# Patient Record
Sex: FEMALE | Race: WHITE | NOT HISPANIC OR LATINO | Employment: OTHER | URBAN - METROPOLITAN AREA
[De-identification: names, ages, dates, MRNs, and addresses within clinical notes are randomized per-mention and may not be internally consistent; named-entity substitution may affect disease eponyms.]

---

## 2017-02-18 ENCOUNTER — HOSPITAL ENCOUNTER (EMERGENCY)
Facility: HOSPITAL | Age: 82
Discharge: HOME | End: 2017-02-18
Attending: EMERGENCY MEDICINE | Admitting: EMERGENCY MEDICINE
Payer: MEDICARE

## 2017-02-18 VITALS
TEMPERATURE: 99.2 F | HEART RATE: 68 BPM | DIASTOLIC BLOOD PRESSURE: 80 MMHG | RESPIRATION RATE: 16 BRPM | WEIGHT: 98 LBS | OXYGEN SATURATION: 99 % | HEIGHT: 56 IN | SYSTOLIC BLOOD PRESSURE: 166 MMHG | BODY MASS INDEX: 22.04 KG/M2

## 2017-02-18 DIAGNOSIS — R13.10 DYSPHAGIA, UNSPECIFIED TYPE: Primary | ICD-10-CM

## 2017-02-18 DIAGNOSIS — Z87.19 HISTORY OF ESOPHAGEAL STRICTURE: ICD-10-CM

## 2017-02-18 PROCEDURE — 99283 EMERGENCY DEPT VISIT LOW MDM: CPT

## 2017-02-18 RX ORDER — FEBUXOSTAT 40 MG/1
40 TABLET, FILM COATED ORAL EVERY MORNING
COMMUNITY

## 2017-03-31 ENCOUNTER — LAB CONVERSION - ENCOUNTER (OUTPATIENT)
Dept: OTHER | Facility: OTHER | Age: 82
End: 2017-03-31

## 2017-03-31 ENCOUNTER — GENERIC CONVERSION - ENCOUNTER (OUTPATIENT)
Dept: OTHER | Facility: OTHER | Age: 82
End: 2017-03-31

## 2017-03-31 ENCOUNTER — TRANSCRIBE ORDERS (OUTPATIENT)
Dept: ADMINISTRATIVE | Facility: HOSPITAL | Age: 82
End: 2017-03-31

## 2017-03-31 ENCOUNTER — APPOINTMENT (OUTPATIENT)
Dept: LAB | Facility: HOSPITAL | Age: 82
End: 2017-03-31
Attending: INTERNAL MEDICINE
Payer: MEDICARE

## 2017-03-31 DIAGNOSIS — M1A.09X1 IDIOPATHIC CHRONIC GOUT OF MULTIPLE SITES WITH TOPHUS: ICD-10-CM

## 2017-03-31 DIAGNOSIS — M1A.09X1 IDIOPATHIC CHRONIC GOUT OF MULTIPLE SITES WITH TOPHUS: Primary | ICD-10-CM

## 2017-03-31 LAB
ALBUMIN SERPL BCP-MCNC: 3.1 G/DL (ref 3.5–5)
ALP SERPL-CCNC: 107 U/L (ref 46–116)
ALT SERPL W P-5'-P-CCNC: 20 U/L (ref 12–78)
ANION GAP SERPL CALCULATED.3IONS-SCNC: 9 MMOL/L (ref 4–13)
AST SERPL W P-5'-P-CCNC: 21 U/L (ref 5–45)
BASOPHILS # BLD AUTO: 0.07 THOUSANDS/ΜL (ref 0–0.1)
BASOPHILS NFR BLD AUTO: 1 % (ref 0–1)
BILIRUB SERPL-MCNC: 0.3 MG/DL (ref 0.2–1)
BUN SERPL-MCNC: 35 MG/DL (ref 5–25)
CALCIUM SERPL-MCNC: 9.4 MG/DL (ref 8.3–10.1)
CHLORIDE SERPL-SCNC: 106 MMOL/L (ref 100–108)
CO2 SERPL-SCNC: 25 MMOL/L (ref 21–32)
CREAT SERPL-MCNC: 1.47 MG/DL (ref 0.6–1.3)
EOSINOPHIL # BLD AUTO: 0.4 THOUSAND/ΜL (ref 0–0.61)
EOSINOPHIL NFR BLD AUTO: 6 % (ref 0–6)
ERYTHROCYTE [DISTWIDTH] IN BLOOD BY AUTOMATED COUNT: 15.3 % (ref 11.6–15.1)
GFR SERPL CREATININE-BSD FRML MDRD: 33.3 ML/MIN/1.73SQ M
GLUCOSE P FAST SERPL-MCNC: 81 MG/DL (ref 65–99)
HCT VFR BLD AUTO: 34.3 % (ref 34.8–46.1)
HGB BLD-MCNC: 10.3 G/DL (ref 11.5–15.4)
LYMPHOCYTES # BLD AUTO: 3.15 THOUSANDS/ΜL (ref 0.6–4.47)
LYMPHOCYTES NFR BLD AUTO: 47 % (ref 14–44)
MCH RBC QN AUTO: 26.8 PG (ref 26.8–34.3)
MCHC RBC AUTO-ENTMCNC: 30 G/DL (ref 31.4–37.4)
MCV RBC AUTO: 89 FL (ref 82–98)
MONOCYTES # BLD AUTO: 0.98 THOUSAND/ΜL (ref 0.17–1.22)
MONOCYTES NFR BLD AUTO: 15 % (ref 4–12)
NEUTROPHILS # BLD AUTO: 2.1 THOUSANDS/ΜL (ref 1.85–7.62)
NEUTS SEG NFR BLD AUTO: 31 % (ref 43–75)
PLATELET # BLD AUTO: 235 THOUSANDS/UL (ref 149–390)
PMV BLD AUTO: 10.4 FL (ref 8.9–12.7)
POTASSIUM SERPL-SCNC: 4.1 MMOL/L (ref 3.5–5.3)
PROT SERPL-MCNC: 7.3 G/DL (ref 6.4–8.2)
RBC # BLD AUTO: 3.85 MILLION/UL (ref 3.81–5.12)
SODIUM SERPL-SCNC: 140 MMOL/L (ref 136–145)
URATE SERPL-MCNC: 4.3 MG/DL (ref 2–6.8)
WBC # BLD AUTO: 6.7 THOUSAND/UL (ref 4.31–10.16)

## 2017-03-31 PROCEDURE — 85025 COMPLETE CBC W/AUTO DIFF WBC: CPT

## 2017-03-31 PROCEDURE — 84550 ASSAY OF BLOOD/URIC ACID: CPT

## 2017-03-31 PROCEDURE — 36415 COLL VENOUS BLD VENIPUNCTURE: CPT

## 2017-03-31 PROCEDURE — 80053 COMPREHEN METABOLIC PANEL: CPT

## 2017-04-03 ENCOUNTER — TRANSCRIBE ORDERS (OUTPATIENT)
Dept: ADMINISTRATIVE | Facility: HOSPITAL | Age: 82
End: 2017-04-03

## 2017-04-03 DIAGNOSIS — R13.19 ESOPHAGEAL DYSPHAGIA: Primary | ICD-10-CM

## 2017-04-07 ENCOUNTER — HOSPITAL ENCOUNTER (OUTPATIENT)
Dept: RADIOLOGY | Facility: HOSPITAL | Age: 82
Discharge: HOME | End: 2017-04-07
Attending: INTERNAL MEDICINE
Payer: MEDICARE

## 2017-04-07 DIAGNOSIS — R13.19 ESOPHAGEAL DYSPHAGIA: ICD-10-CM

## 2017-04-07 PROCEDURE — 74220 X-RAY XM ESOPHAGUS 1CNTRST: CPT

## 2017-07-19 ENCOUNTER — ALLSCRIPTS OFFICE VISIT (OUTPATIENT)
Dept: OTHER | Facility: OTHER | Age: 82
End: 2017-07-19

## 2017-07-19 ENCOUNTER — APPOINTMENT (OUTPATIENT)
Dept: RADIOLOGY | Facility: CLINIC | Age: 82
End: 2017-07-19
Payer: MEDICARE

## 2017-07-19 DIAGNOSIS — M25.521 PAIN IN RIGHT ELBOW: ICD-10-CM

## 2017-07-19 DIAGNOSIS — M79.89 OTHER DISORDERS OF SOFT TISSUE: ICD-10-CM

## 2017-07-19 DIAGNOSIS — M17.11 PRIMARY OSTEOARTHRITIS OF RIGHT KNEE: ICD-10-CM

## 2017-07-19 PROCEDURE — 73130 X-RAY EXAM OF HAND: CPT

## 2017-08-14 ENCOUNTER — APPOINTMENT (OUTPATIENT)
Dept: RADIOLOGY | Facility: CLINIC | Age: 82
End: 2017-08-14
Payer: MEDICARE

## 2017-08-14 ENCOUNTER — ALLSCRIPTS OFFICE VISIT (OUTPATIENT)
Dept: OTHER | Facility: OTHER | Age: 82
End: 2017-08-14

## 2017-08-14 DIAGNOSIS — M25.521 PAIN IN RIGHT ELBOW: ICD-10-CM

## 2017-08-14 PROCEDURE — 73080 X-RAY EXAM OF ELBOW: CPT

## 2017-11-02 ENCOUNTER — ALLSCRIPTS OFFICE VISIT (OUTPATIENT)
Dept: OTHER | Facility: OTHER | Age: 82
End: 2017-11-02

## 2018-01-10 NOTE — PROGRESS NOTES
Preliminary Nursing Report                Patient Information    Initial Encounter Entry Date:   2016 10:10 AM EST (Automated Transmission Automated Transmission)       Last Modified:   {Terri Schwartz}              Legal Name: Sukhdeep Mayorga        Social Security Number:        YOB: 1926        Age (years): 80        Gender: F        Body Mass Index (BMI): 25 kg/m2        Height: 56 in  Weight: 110 lbs (50 kgs)           Address:   [address] [place] [address] [address]              Phone: [phone]   (consent to leave messages)        Email:        Ethnicity: Decline to State        Yarsanism:        Marital Status:        Preferred Language: English        Race: Other Race                    Patient Insurance Information        Primary Insurance Information Carrier Name: {Primary  CarrierName}           Carrier Address:   {Primary  CarrierAddress}              Carrier Phone: {Primary  CarrierPhone}          Group Number: {Primary  GroupNumber}          Policy Number: {Primary  PolicyNumber}          Insured Name: {Primary  InsuredName}          Insured : {Primary  InsuredDOB}          Relationship to Insured: {Primary  RelationshiptoInsured}           Secondary Insurance Information Carrier Name: {Secondary  CarrierName}           Carrier Address:   {Secondary  CarrierAddress}              Carrier Phone: {Secondary  CarrierPhone}          Group Number: {Secondary  GroupNumber}          Policy Number: {Secondary  PolicyNumber}          Insured Name: {Secondary  InsuredName}          Insured : {Secondary  InsuredDOB}          Relationship to Insured: {Secondary  RelationshiptoInsured}                       Health Profile   Booking #:   Jerel Cee #: [id number]               DOS: 2016    Surgery : Arthroplasty, elbow; with distal humerus and proximal ulnar prosthetic replacement (eg, total elbow)    Add'l Procedures/Notes:     Surgery Risk: Intermediate          Precautions Chronic kidney disease, stage 3                   Allergies    Shellfish       Vioxx TABS       Clinical Comments: Reaction Type: , Reaction: , Severity:              Medications    Bimatoprost 0 03 % Ophthalmic Solution       Calcium 600 MG Oral Tablet       Dorzolamide HCl-Timolol Mal 22 3-6 8 MG/ML Ophthalmic Solution       Furosemide 40 MG Oral Tablet       Klor-Con M10 10 MEQ Oral Tablet Extended Release       Lumigan 0 01 % Ophthalmic Solution       Multivitamins TABS       Omeprazole 20 MG Oral Capsule Delayed Release       Potassium Chloride Jing ER 10 MEQ Oral Tablet Extended Release       Uloric 40 MG Oral Tablet       Vitamin D3 1000 UNIT Oral Tablet               Conditions    Chronic gout without tophus       Chronic kidney disease, stage 3       Colonoscopy (Fiberoptic) Screening       Decubitus ulcer, unspecified pressure ulcer stage       Dyslipidemia       Dysphagia       Edema       Flu vaccine need       Gait disturbance       Glaucoma       Hoarseness       Hypokalemia       Orthostatic hypotension       Osteoporosis       Other closed displaced fracture of distal end of right humerus, initial encounter       Pressure Ulcer Of Back       Rheumatoid arthritis       Streptococcus pneumoniae vaccination indicated       Stress incontinence in female       Visit for screening mammogram       Vitamin D deficiency       Zenker's diverticulum               Family History    None             Surgical History    None             Social History    Marital History -        Never A Smoker       Never Drank Alcohol                               Patient Instructions       ? NPO Instructions   The day before surgery it is recommended to have a light dinner at your usual time and you are allowed a light snack early in the evening  Do not eat anything heavy or eat a big meal after 7pm  Do not eat or drink anything after midnight prior to your surgery   If you are supposed to take any of your medications, do so with a sip of water  Failure to follow these instructions can lead to an increased risk of lung complications and may result in a delay or cancellation of your procedure  If you have any questions, contact your institution for further instructions  No candy, no gum, no mints, no chewing tobacco   Triggered by: Medical Procedure Risk         ? Diuretics (Water Pills) 28, 29  Please continue the following medications up to the evening before surgery, but do not take it on the day of surgery  Triggered by: Furosemide 40 MG Oral Tablet               Testing Considerations       ? Basic Metabolic Panel (BMP) t  If test was completed and normal within last six months, repeat test is not necessary  Triggered by: Chronic kidney disease, stage 3         ? Complete Blood Count (CBC) t  If test was completed and normal within last six months, repeat test is not necessary  Triggered by: Chronic kidney disease, stage 3         ? Electrocardiogram (ECG) t  Patient does not need new test if normal ECG is present within the last six months and no change in clinical condition  Triggered by: Chronic kidney disease, stage 3, Hypokalemia, Rheumatoid arthritis, Age or Facility Rec               Consultations       ? Primary Care Physician Evaluation   Primary care physician may need to evaluate patient prior to surgery  This is likely NOT necessary if the listed conditions are chronic and stable  Triggered by: Rheumatoid arthritis               Miscellaneous Questions         Question: Are you able to walk up a flight of stairs, walk up a hill or do heavy housework WITHOUT having chest pain or shortness of breath? Answer: YES                   Allergies/Conditions/Medications Not Found        The following were not recognized by our system when generating the recommendations  Please consider if this would impact any preoperative protocols  ? Colonoscopy (Fiberoptic) Screening       ? Marital History -        ?  Never A Smoker       ? Never Drank Alcohol       ? Pressure Ulcer Of Back       ? Streptococcus pneumoniae vaccination indicated       ? Visit for screening mammogram       ? Bimatoprost 0 03 % Ophthalmic Solution       ? Calcium 600 MG Oral Tablet       ? Dorzolamide HCl-Timolol Mal 22 3-6 8 MG/ML Ophthalmic Solution       ? Klor-Con M10 10 MEQ Oral Tablet Extended Release       ? Lumigan 0 01 % Ophthalmic Solution       ? Multivitamins TABS       ? Vitamin D3 1000 UNIT Oral Tablet                  Appointment Information         Date:    04/19/2016        Location:    [facility]        Address:           Directions:                      Footnotes revision 14      ?? Denotes a free-text entry  Legal Disclaimer: Any and all recommendations and services provided herein are designed to assist in the preoperative care of the patient  Nothing contained herein is designed to replace, eliminate or alleviate the responsibility of the attending physician to supervise and determine the patient?s preoperative care and course of treatment  Failure to provide complete, accurate information may negatively impact the system?s ability to recommend the proper preoperative protocol  THE ATTENDING PHYSICIAN IS RESPONSIBLE TO REVIEW THE SUGGESTED PREOPERATIVE PROTOCOLS/COURSE OF TREATMENT AND PRESCRIBE THE FINAL COURSE OF PREOPERATIVE TREATMENT IN CONSULTATION WITH THE PATIENT  THE ePREOP SYSTEM AND ITS MATERIALS ARE PROVIDED ? AS IS? WITHOUT WARRANTY OF ANY KIND, EXPRESS OR IMPLIED, INCLUDING, BUT NOT LIMITED TO, WARRANTIES OF PERFORMANCE OR MERCHANTABILITY OR FITNESS FOR A PARTICULAR PURPOSE  PATIENT AND PHYSICIANS HEREBY AGREE THAT THEIR USE OF THE MATERIALS AND RESOURCES ACT AS A CONSENT TO RELEASE AND WAIVE ePREOP FROM ANY AND ALL CLAIMS OF WARRANTY, TORT OR CONTRACT LAW OF ANY KIND  Electronically signed Genie GILES    Apr 13 2016  2:12PM EST

## 2018-01-13 VITALS
WEIGHT: 103.25 LBS | HEIGHT: 56 IN | BODY MASS INDEX: 23.22 KG/M2 | HEART RATE: 64 BPM | SYSTOLIC BLOOD PRESSURE: 119 MMHG | DIASTOLIC BLOOD PRESSURE: 66 MMHG

## 2018-01-14 VITALS
HEART RATE: 72 BPM | SYSTOLIC BLOOD PRESSURE: 150 MMHG | WEIGHT: 103 LBS | BODY MASS INDEX: 23.17 KG/M2 | HEIGHT: 56 IN | DIASTOLIC BLOOD PRESSURE: 65 MMHG

## 2018-01-14 NOTE — PROGRESS NOTES
Assessment    1  Preoperative clearance (V72 84) (Z01 818)   2  Other closed displaced fracture of distal end of right humerus, initial encounter (812 49)   (S42 491A)   3  Chronic kidney disease, stage 3 (585 3) (N18 3)   4  Osteoporosis (733 00) (M81 0)    Plan  Preoperative clearance    · EKG/ECG- POC; Status:Complete;   Done: 27VHP7526 01:42PM    Discussion/Summary  Surgical Clearance: She is at a LOW TO MODERATE risk from a cardiovascular standpoint at this time without any additional cardiac testing  Reevaluation needed, if she should present with symptoms prior to surgery/procedure  Comments:  BW and ECG reviewed; CXR to be done on Monday  Surgical clearance faxed to Dr Bernardo May   Pt is low-moderate risk for low risk surgery - BW and ECG reviewed no further testing needed, CXR is scheduled for Monday; no further work up needed at this time; advised to hold vitamins x 5 days; may con't lasix/Klor Con/Omeprazole/Uloric and her eye gtt; will fax clearance over to Dr Pena Shall office    CKD stage 3 - stable; avoid NSAIDs after surgery    Osteoporosis with fracture above - on calcium and vitamin d; will have to further discuss Prolia as recommended previously with Rheum  The patient, patient's family was counseled regarding diagnostic results, instructions for management, risk factor reductions, impressions  Chief Complaint  Preoperative eval      History of Present Illness  Pre-Op Visit (Brief): The patient is being seen for a preoperative visit  Surgical Risk Assessment:   Prior Anesthesia: She had prior anesthesia and no prior adverse reaction to general anesthesia  Pertinent Past Medical History: DVT and dental implants, but no CAD, no CHF, no chronic liver disease, no acute hepatitis, no pulmonary embolism, no diabetes, does not wear dentures, no seizure disorder, no CVA, no asthma, no COPD, no renal disease and no obesity   Exercise Capacity: able to walk four blocks without symptoms and able to walk two flights of stairs without symptoms  Lifestyle Factors: denies alcohol use and denies tobacco use  Symptoms: no easy bleeding, easy bruising, no cough, no dyspnea, no edema, no palpitations and no wheezing  Pertinent Family History: ischemic heart disease and father with heart disease, but no family history of an adverse reaction to anesthesia, no bleeding problems and no stroke  Living Situation: home is secure and supportive and wants to go to [facility] and they have to see about a bed after the surgery  HPI: Pt leaned up against a railing on Sunday and landed on her R elbow  She has suffered a closed displaced fracture of the R humerus  She has had minimal pain unless someone touches the elbow  She is having some swelling in her hands and fingers  She has planned surgery on Tuesday and had her preop BW done but doesn't have ECG or CXR until Monday      Review of Systems    Constitutional: no fever and no chills  Eyes: no eyesight problems  ENT: no sore throat and no nasal discharge  Cardiovascular: no chest pain, no palpitations and no lower extremity edema  Respiratory: no shortness of breath and no cough  Gastrointestinal: no nausea, no vomiting and no diarrhea  Genitourinary: no dysuria  Musculoskeletal: joint swelling and joint stiffness  Integumentary: no rashes  Neurological: no headache, no numbness, no tingling and no dizziness  Psychiatric: no depression  Endocrine: no muscle weakness  Hematologic/Lymphatic: no tendency for easy bleeding and no tendency for easy bruising  Active Problems    1  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)   2  Chronic kidney disease, stage 3 (585 3) (N18 3)   3  Colonoscopy (Fiberoptic) Screening   4  Dyslipidemia (272 4) (E78 5)   5  Dysphagia (787 20) (R13 10)   6  Edema (782 3) (R60 9)   7  Flu vaccine need (V04 81) (Z23)   8  Gait disturbance (781 2) (R26 9)   9  Glaucoma (365 9) (H40 9)   10   Hoarseness (784 42) (R49 0)   11  Hypokalemia (276 8) (E87 6)   12  Orthostatic hypotension (458 0) (I95 1)   13  Osteoporosis (733 00) (M81 0)   14  Other closed displaced fracture of distal end of right humerus, initial encounter (812 49)    (S42 491A)   15  Rheumatoid arthritis (714 0) (M06 9)   16  Streptococcus pneumoniae vaccination indicated (V49 89) (Z91 89)   17  Stress incontinence in female (625 6) (N39 3)   18  Visit for screening mammogram (V76 12) (Z12 31)   19  Vitamin D deficiency (268 9) (E55 9)   20  Zenker's diverticulum (530 6) (K22 5)    Past Medical History    · History of Acute deep vein thrombosis of lower limb, unspecified laterality   · History of Decubitus ulcer, unspecified pressure ulcer stage (707 00,707 20) (L89 90)   · History of basal cell carcinoma (V10 83) (Z85 828)   · History of Ileus (560 1) (K56 7)   · History of Impacted cerumen of right ear (380 4) (H61 21)   · History of Impacted cerumen, unspecified laterality (380 4) (H61 20)   · History of Impacted cerumen, unspecified laterality (380 4) (H61 20)   · History of Nonspecific abnormal results of function study of thyroid (794 5) (R94 6)   · History of Open Wound Of The Left Shoulder (880 00)   · History of Pressure Ulcer Of Back (707 09)    The active problems and past medical history were reviewed and updated today  Surgical History    · History of Cataract Surgery   · History of Complete Colonoscopy   · History of Diagnostic Esophagogastroduodenoscopy   · History of Hernia Repair Inguinal Sliding   · History of Hysteroscopy With Resection For Intrauterine Polyp Removal   · History of Mastoidectomy, Left Ear   · History of Mastoidectomy, Right Ear   · History of Tonsillectomy    The surgical history was reviewed and updated today  Family History  Father    · Family history of cardiac disorder (V17 49) (Z82 49)    The family history was reviewed and updated today         Social History    · Marital History -    · Never A Smoker   · Never Drank Alcohol  The social history was reviewed and is unchanged  Current Meds   1  Calcium 600 MG Oral Tablet; TAKE 1 TABLET DAILY; Therapy: 84CTO8213 to Recorded   2  Dorzolamide HCl-Timolol Mal 22 3-6 8 MG/ML Ophthalmic Solution; Therapy: 18VQG0416 to (Last ZX:26JVP6190)  Requested for: 12IHT3919 Ordered   3  Furosemide 40 MG Oral Tablet; TAKE 1 TABLET DAILY AS DIRECTED; Therapy: 37OHC4156 to (Evaluate:05Oct2016)  Requested for: 07SLX9912; Last   Rx:09Mar2016 Ordered   4  Klor-Con M10 10 MEQ Oral Tablet Extended Release; take one tablet by mouth daily; Therapy: 15Apr2011 to (Last Rx:53Klx3402)  Requested for: 32Ljk6810 Ordered   5  Lumigan 0 01 % Ophthalmic Solution; Therapy: 44BDK4706 to (Last Rx:04Apr2011)  Requested for: 59Dxn8479 Ordered   6  Multivitamins TABS; TAKE 1 TABLET DAILY; Therapy: 33LYG0352 to Recorded   7  Omeprazole 20 MG Oral Capsule Delayed Release; TAKE 1 CAPSULE DAILY; Therapy: 89CJE6714 to (Evaluate:22Jan2015) Recorded   8  Potassium Chloride Jing ER 10 MEQ Oral Tablet Extended Release; take one tablet by   mouth daily; Therapy: 31Eet4056 to (Last Rx:02Nov2015)  Requested for: 02WNR3755 Ordered   9  Uloric 40 MG Oral Tablet; TAKE 1 TABLET DAILY AS DIRECTED; Therapy: 94Fmh4920 to ([place])  Requested for: 82BWN6504 Recorded   10  Vitamin D3 1000 UNIT Oral Tablet; TAKE 2 TABLETS DAILY; Therapy: 75LIX0143 to (Evaluate:23Tir7818) Recorded    The medication list was reviewed and updated today  Allergies    1  Vioxx TABS    2  Shellfish    Vitals   Recorded: 15Apr2016 01:11PM   Temperature 96 6 F   Heart Rate 78   Systolic 746   Diastolic 88   Height 4 ft 8 in   Weight 112 lb    BMI Calculated 25 11   BSA Calculated 1 39     Physical Exam    Constitutional   General appearance: No acute distress, well appearing and well nourished  Eyes   Conjunctiva and lids: No swelling, erythema or discharge      Ears, Nose, Mouth, and Throat External inspection of ears and nose: Abnormal   B/L hearing aides  Lips, teeth, and gums: Abnormal   dental implants  Pulmonary   Respiratory effort: No increased work of breathing or signs of respiratory distress  Auscultation of lungs: Clear to auscultation  Cardiovascular   Auscultation of heart: Normal rate and rhythm, normal S1 and S2, no murmurs  Examination of extremities for edema and/or varicosities: Abnormal   trace B/L LE edema  Abdomen   Abdomen: Non-tender, no masses  Musculoskeletal   Gait and station: Normal   RUE on sling with edema and bruising to RUE  Psychiatric   Mood and affect: Normal        Results/Data  No acute ischemia  Rhythm and rate: @ 66 bpm   normal sinus rhythm  SD Interval: first degree heart block, 252 seconds  Axis: the QRS axis is normal    ST segment: QTc interval: 389 The T waves are flat  Comparison to prior ECGs: no prior ECGs were available for comparison  End of Encounter Meds    1  Uloric 40 MG Oral Tablet; TAKE 1 TABLET DAILY AS DIRECTED; Therapy: 15Apr2011 to (Jorden Denson)  Requested for: 33USH7935 Recorded    2  Omeprazole 20 MG Oral Capsule Delayed Release; TAKE 1 CAPSULE DAILY; Therapy: 53VEX1271 to (Evaluate:22Jan2015) Recorded    3  Furosemide 40 MG Oral Tablet; TAKE 1 TABLET DAILY AS DIRECTED; Therapy: 48YJS5379 to (Evaluate:05Oct2016)  Requested for: 38GQG6144; Last   Rx:09Mar2016 Ordered    4  Multivitamins TABS; TAKE 1 TABLET DAILY; Therapy: 24APT1333 to Recorded    5  Klor-Con M10 10 MEQ Oral Tablet Extended Release; take one tablet by mouth daily; Therapy: 41Hhh2489 to (Last Rx:76Cbc0708)  Requested for: 53Duv9655 Ordered   6  Potassium Chloride Jing ER 10 MEQ Oral Tablet Extended Release; take one tablet by   mouth daily; Therapy: 56Qpi2148 to (Last Rx:02Nov2015)  Requested for: 87VIM2945 Ordered    7  Calcium 600 MG Oral Tablet; TAKE 1 TABLET DAILY; Therapy: 75SMC4137 to Recorded    8   Vitamin D3 1000 UNIT Oral Tablet; TAKE 2 TABLETS DAILY; Therapy: 52OZX8804 to (Evaluate:26Xpt6196) Recorded    9  Dorzolamide HCl-Timolol Mal 22 3-6 8 MG/ML Ophthalmic Solution; Therapy: 97BMQ9183 to (Last VH:37BNT0898)  Requested for: 28RZG5422 Ordered   10  Lumigan 0 01 % Ophthalmic Solution; Therapy: 84YZI8809 to (Last Rx:04Apr2011)  Requested for: [address] [place] Ordered    Future Appointments    Date/Time Provider Specialty Site   04/19/2016 11:15 AM DESMOND Cornell  [address] [place] [address] OR   05/02/2016 01:30 PM DESMOND Cornell  Orthopedic Surgery [facility]   05/02/2016 11:00 AM Vlad Dupont DO Internal Medicine Evelyn Villasenor MD     Signatures   Electronically signed by : Lynda Rahman DO;  Apr 15 2016  1:47PM EST                       (Author)

## 2018-01-14 NOTE — CONSULTS
Chief Complaint  Preoperative eval      History of Present Illness  Pre-Op Visit (Brief): The patient is being seen for a preoperative visit  Surgical Risk Assessment:   Prior Anesthesia: She had prior anesthesia and no prior adverse reaction to general anesthesia  Pertinent Past Medical History: DVT and dental implants, but no CAD, no CHF, no chronic liver disease, no acute hepatitis, no pulmonary embolism, no diabetes, does not wear dentures, no seizure disorder, no CVA, no asthma, no COPD, no renal disease and no obesity  Exercise Capacity: able to walk four blocks without symptoms and able to walk two flights of stairs without symptoms  Lifestyle Factors: denies alcohol use and denies tobacco use  Symptoms: no easy bleeding, easy bruising, no cough, no dyspnea, no edema, no palpitations and no wheezing  Pertinent Family History: ischemic heart disease and father with heart disease, but no family history of an adverse reaction to anesthesia, no bleeding problems and no stroke  Living Situation: home is secure and supportive and wants to go to [facility] and they have to see about a bed after the surgery  HPI: Pt leaned up against a railing on Sunday and landed on her R elbow  She has suffered a closed displaced fracture of the R humerus  She has had minimal pain unless someone touches the elbow  She is having some swelling in her hands and fingers  She has planned surgery on Tuesday and had her preop BW done but doesn't have ECG or CXR until Monday      Review of Systems    Constitutional: no fever and no chills  Eyes: no eyesight problems  ENT: no sore throat and no nasal discharge  Cardiovascular: no chest pain, no palpitations and no lower extremity edema  Respiratory: no shortness of breath and no cough  Gastrointestinal: no nausea, no vomiting and no diarrhea  Genitourinary: no dysuria  Musculoskeletal: joint swelling and joint stiffness  Integumentary: no rashes     Neurological: no headache, no numbness, no tingling and no dizziness  Psychiatric: no depression  Endocrine: no muscle weakness  Hematologic/Lymphatic: no tendency for easy bleeding and no tendency for easy bruising  Active Problems    1  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)   2  Chronic kidney disease, stage 3 (585 3) (N18 3)   3  Colonoscopy (Fiberoptic) Screening   4  Dyslipidemia (272 4) (E78 5)   5  Dysphagia (787 20) (R13 10)   6  Edema (782 3) (R60 9)   7  Flu vaccine need (V04 81) (Z23)   8  Gait disturbance (781 2) (R26 9)   9  Glaucoma (365 9) (H40 9)   10  Hoarseness (784 42) (R49 0)   11  Hypokalemia (276 8) (E87 6)   12  Orthostatic hypotension (458 0) (I95 1)   13  Osteoporosis (733 00) (M81 0)   14  Other closed displaced fracture of distal end of right humerus, initial encounter (812 49)    (S42 491A)   15  Rheumatoid arthritis (714 0) (M06 9)   16  Streptococcus pneumoniae vaccination indicated (V49 89) (Z91 89)   17  Stress incontinence in female (625 6) (N39 3)   18  Visit for screening mammogram (V76 12) (Z12 31)   19  Vitamin D deficiency (268 9) (E55 9)   20  Zenker's diverticulum (530 6) (K22 5)    Past Medical History    · History of Acute deep vein thrombosis of lower limb, unspecified laterality   · History of Decubitus ulcer, unspecified pressure ulcer stage (707 00,707 20) (L89 90)   · History of basal cell carcinoma (V10 83) (Z85 828)   · History of Ileus (560 1) (K56 7)   · History of Impacted cerumen of right ear (380 4) (H61 21)   · History of Impacted cerumen, unspecified laterality (380 4) (H61 20)   · History of Impacted cerumen, unspecified laterality (380 4) (H61 20)   · History of Nonspecific abnormal results of function study of thyroid (794 5) (R94 6)   · History of Open Wound Of The Left Shoulder (880 00)   · History of Pressure Ulcer Of Back (707 09)    The active problems and past medical history were reviewed and updated today        Surgical History    · History of Cataract Surgery   · History of Complete Colonoscopy   · History of Diagnostic Esophagogastroduodenoscopy   · History of Hernia Repair Inguinal Sliding   · History of Hysteroscopy With Resection For Intrauterine Polyp Removal   · History of Mastoidectomy, Left Ear   · History of Mastoidectomy, Right Ear   · History of Tonsillectomy    The surgical history was reviewed and updated today  Family History    · Family history of cardiac disorder (V17 49) (Z82 49)    The family history was reviewed and updated today  Social History    · Marital History -    · Never A Smoker   · Never Drank Alcohol  The social history was reviewed and is unchanged  Current Meds   1  Calcium 600 MG Oral Tablet; TAKE 1 TABLET DAILY; Therapy: 49HCG6595 to Recorded   2  Dorzolamide HCl-Timolol Mal 22 3-6 8 MG/ML Ophthalmic Solution; Therapy: 09RTI7237 to (Last SUHA:97ERI7783)  Requested for: 06PTD9831 Ordered   3  Furosemide 40 MG Oral Tablet; TAKE 1 TABLET DAILY AS DIRECTED; Therapy: 28KGG5811 to (Evaluate:05Oct2016)  Requested for: 62JXQ4768; Last   Rx:09Mar2016 Ordered   4  Klor-Con M10 10 MEQ Oral Tablet Extended Release; take one tablet by mouth daily; Therapy: 92Jjr9214 to (Last Rx:79Arc6354)  Requested for: 50Dck1628 Ordered   5  Lumigan 0 01 % Ophthalmic Solution; Therapy: 58TPM0291 to (Last Rx:04Apr2011)  Requested for: 40Duv9946 Ordered   6  Multivitamins TABS; TAKE 1 TABLET DAILY; Therapy: 09JWW9456 to Recorded   7  Omeprazole 20 MG Oral Capsule Delayed Release; TAKE 1 CAPSULE DAILY; Therapy: 27TSY9242 to (Evaluate:22Jan2015) Recorded   8  Potassium Chloride Jing ER 10 MEQ Oral Tablet Extended Release; take one tablet by   mouth daily; Therapy: 50Wnb0234 to (Last Rx:02Nov2015)  Requested for: 77UCS2509 Ordered   9  Uloric 40 MG Oral Tablet; TAKE 1 TABLET DAILY AS DIRECTED; Therapy: 52Kng9375 to (Tc Wang)  Requested for: 95XSJ6163 Recorded   10   Vitamin D3 1000 UNIT Oral Tablet; TAKE 2 TABLETS DAILY; Therapy: 91JHM4297 to (Evaluate:89Lxz7729) Recorded    The medication list was reviewed and updated today  Allergies    1  Vioxx TABS    2  Shellfish    Vitals  Signs [Data Includes: Current Encounter]    Temperature: 96 6 F  Heart Rate: 78  Systolic: 452  Diastolic: 88  Height: 4 ft 8 in  Weight: 112 lb   BMI Calculated: 25 11  BSA Calculated: 1 39    Physical Exam    Constitutional   General appearance: No acute distress, well appearing and well nourished  Eyes   Conjunctiva and lids: No swelling, erythema or discharge  Ears, Nose, Mouth, and Throat   External inspection of ears and nose: Abnormal   B/L hearing aides  Lips, teeth, and gums: Abnormal   dental implants  Pulmonary   Respiratory effort: No increased work of breathing or signs of respiratory distress  Auscultation of lungs: Clear to auscultation  Cardiovascular   Auscultation of heart: Normal rate and rhythm, normal S1 and S2, no murmurs  Examination of extremities for edema and/or varicosities: Abnormal   trace B/L LE edema  Abdomen   Abdomen: Non-tender, no masses  Musculoskeletal   Gait and station: Normal   RUE on sling with edema and bruising to RUE  Psychiatric   Mood and affect: Normal        Results/Data  No acute ischemia  Rhythm and rate: @ 66 bpm   normal sinus rhythm  IN Interval: first degree heart block, 252 seconds  Axis: the QRS axis is normal    ST segment: QTc interval: 389 The T waves are flat  Comparison to prior ECGs: no prior ECGs were available for comparison  Assessment    1  Preoperative clearance (V72 84) (Z01 818)   2  Other closed displaced fracture of distal end of right humerus, initial encounter (812 49)   (S42 491A)   3  Chronic kidney disease, stage 3 (585 3) (N18 3)   4   Osteoporosis (733 00) (M81 0)    Plan  Preoperative clearance    · EKG/ECG- POC; Status:Complete;   Done: 12JEB4521 01:42PM    Discussion/Summary  Surgical Clearance: She is at a LOW TO MODERATE risk from a cardiovascular standpoint at this time without any additional cardiac testing  Reevaluation needed, if she should present with symptoms prior to surgery/procedure  Comments:  BW and ECG reviewed; CXR to be done on Monday  Surgical clearance faxed to Dr Maxime Krishnan   Pt is low-moderate risk for low risk surgery - BW and ECG reviewed no further testing needed, CXR is scheduled for Monday; no further work up needed at this time; advised to hold vitamins x 5 days; may con't lasix/Klor Con/Omeprazole/Uloric and her eye gtt; will fax clearance over to Dr Catherine Atkins office    CKD stage 3 - stable; avoid NSAIDs after surgery    Osteoporosis with fracture above - on calcium and vitamin d; will have to further discuss Prolia as recommended previously with Rheum  The patient, patient's family was counseled regarding diagnostic results, instructions for management, risk factor reductions, impressions  End of Encounter Meds    1  Uloric 40 MG Oral Tablet; TAKE 1 TABLET DAILY AS DIRECTED; Therapy: 15Apr2011 to ([facility])  Requested for: 59EUW0898 Recorded    2  Omeprazole 20 MG Oral Capsule Delayed Release; TAKE 1 CAPSULE DAILY; Therapy: 51QUB3246 to (Evaluate:22Jan2015) Recorded    3  Furosemide 40 MG Oral Tablet; TAKE 1 TABLET DAILY AS DIRECTED; Therapy: 22QEY4922 to (Evaluate:05Oct2016)  Requested for: 88DWA4925; Last   Rx:09Mar2016 Ordered    4  Multivitamins TABS; TAKE 1 TABLET DAILY; Therapy: 35LNQ8360 to Recorded    5  Klor-Con M10 10 MEQ Oral Tablet Extended Release; take one tablet by mouth daily; Therapy: 32Ali7446 to (Last Rx:72Lsl8403)  Requested for: 27Kpn9869 Ordered   6  Potassium Chloride Jing ER 10 MEQ Oral Tablet Extended Release; take one tablet by   mouth daily; Therapy: 26Poc5816 to (Last Rx:02Nov2015)  Requested for: 23RDO9891 Ordered    7  Calcium 600 MG Oral Tablet; TAKE 1 TABLET DAILY; Therapy: 56BQH9244 to Recorded    8   Vitamin D3 1000 UNIT Oral Tablet; TAKE 2 TABLETS DAILY; Therapy: 70HTH7168 to (Evaluate:90Qnj2316) Recorded    9  Dorzolamide HCl-Timolol Mal 22 3-6 8 MG/ML Ophthalmic Solution; Therapy: 91OAB2928 to (Last IA:05BYR3469)  Requested for: 93DAS6462 Ordered   10  Lumigan 0 01 % Ophthalmic Solution; Therapy: 54EKR2384 to (Last Rx:04Apr2011)  Requested for: [address] [place] Ordered    Signatures   Electronically signed by : Montse Mejia DO;  Apr 15 2016  1:47PM EST                       (Author)

## 2018-01-15 NOTE — MISCELLANEOUS
Message   Recorded as Task   Date: 04/23/2016 06:34 AM, Created By: System   Task Name: Shani Zarco   Assigned To: [id number]   Regarding Patient: Dori Ruth, Status: Active   Comment:    System - 23 Apr 2016 6:34 AM     Patient discharged from hospital   Patient Name: Dori Ruth  Patient YOB: 1926  Discharge Date: 04/22/2016  Facility: [facility] - 24 Apr 2016 3:51 PM     TASK REASSIGNED: Previously Assigned To Lisa Morales Karen - 25 Apr 2016 11:49 AM     TASK REPLIED TO: Previously Assigned To [facility] to call back   Erica Alford - 26 Apr 2016 6:20 PM     TASK REPLIED TO: Previously Assigned To [address] 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)   2  Chronic kidney disease, stage 3 (585 3) (N18 3)   3  Colonoscopy (Fiberoptic) Screening   4  Dyslipidemia (272 4) (E78 5)   5  Dysphagia (787 20) (R13 10)   6  Edema (782 3) (R60 9)   7  Flu vaccine need (V04 81) (Z23)   8  Gait disturbance (781 2) (R26 9)   9  Glaucoma (365 9) (H40 9)   10  Hoarseness (784 42) (R49 0)   11  Hypokalemia (276 8) (E87 6)   12  Leg pain, right (729 5) (M79 604)   13  Orthostatic hypotension (458 0) (I95 1)   14  Osteoporosis (733 00) (M81 0)   15  Other closed displaced fracture of distal end of right humerus, initial encounter (812 49)    (S42 491A)   16  Preoperative clearance (V72 84) (Z01 818)   17  Rheumatoid arthritis (714 0) (M06 9)   18  Right knee pain (719 46) (M25 561)   19  Streptococcus pneumoniae vaccination indicated (V49 89) (Z91 89)   20  Stress incontinence in female (625 6) (N39 3)   21  Visit for screening mammogram (V76 12) (Z12 31)   22  Vitamin D deficiency (268 9) (E55 9)   23  Zenker's diverticulum (530 6) (K22 5)    Current Meds   1  Calcium 600 MG Oral Tablet; TAKE 1 TABLET DAILY; Therapy: 74OOP6770 to Recorded   2   Dorzolamide HCl-Timolol Mal 22 3-6 8 MG/ML Ophthalmic Solution; Therapy: 69VYL7252 to (Last MO:59WZG3059)  Requested for: 12GLX6938 Ordered   3  Furosemide 40 MG Oral Tablet; TAKE 1 TABLET DAILY AS DIRECTED; Therapy: 61MFL8055 to (Evaluate:05Oct2016)  Requested for: 28KKO5248; Last   Rx:09Mar2016 Ordered   4  Klor-Con M10 10 MEQ Oral Tablet Extended Release; take one tablet by mouth daily; Therapy: 91Imf4008 to (Last Rx:68Lzp6342)  Requested for: 70Zsb6740 Ordered   5  Lumigan 0 01 % Ophthalmic Solution; Therapy: 40HQN2429 to (Last Rx:04Apr2011)  Requested for: 56Efa3359 Ordered   6  Multivitamins TABS; TAKE 1 TABLET DAILY; Therapy: 23TAC6217 to Recorded   7  Omeprazole 20 MG Oral Capsule Delayed Release; TAKE 1 CAPSULE DAILY; Therapy: 29QCT5245 to (Evaluate:22Jan2015) Recorded   8  Potassium Chloride Jing ER 10 MEQ Oral Tablet Extended Release; take one tablet by   mouth daily; Therapy: 05Sbg1054 to (Last Rx:02Nov2015)  Requested for: 55NTK0382 Ordered   9  Uloric 40 MG Oral Tablet; TAKE 1 TABLET DAILY AS DIRECTED; Therapy: 98Adp9619 to (Karan Meyers)  Requested for: 04PXG1371 Recorded   10  Vitamin D3 1000 UNIT Oral Tablet; TAKE 2 TABLETS DAILY; Therapy: 04LJY6129 to (Evaluate:17Dtp3437) Recorded    Allergies    1  Vioxx TABS    2   Shellfish    Signatures   Electronically signed by : Lorri Cornelius DO; May  1 2016  3:10PM EST                       (Author)

## 2018-03-07 NOTE — PROGRESS NOTES
History of Present Illness    Revaccination   Vaccine Information: Vaccine(s) Given (names): prevnar  Spoke with caretaker regarding vaccine out of temperature range  Action(s): Pt will be revaccinated  Pt contacted and will call back  Letter Sent (Regular and Certified): [phone]  Other Information: Patient currently resided at [address] [place] [address] and currently has a cold  Nurse asked for us to call back in 2 weeks to arrange appointment  Please call [phone]    called nurse and was informed that we would have to get incontact with family to arrange transportation for Flor Sepulveda to come to our office  They were unable to provide me with contact information  Contact info in her chart is for her neighbor and not family  Letter mailed to patient  Active Problems    1  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)   2  Chronic kidney disease, stage 3 (585 3) (N18 3)   3  Colonoscopy (Fiberoptic) Screening   4  Dyslipidemia (272 4) (E78 5)   5  Dysphagia (787 20) (R13 10)   6  Edema (782 3) (R60 9)   7  Flu vaccine need (V04 81) (Z23)   8  Gait disturbance (781 2) (R26 9)   9  Glaucoma (365 9) (H40 9)   10  Greater trochanteric bursitis of right hip (726 5) (M70 61)   11  Hip pain (719 45) (M25 559)   12  Hoarseness (784 42) (R49 0)   13  Hypokalemia (276 8) (E87 6)   14  Impacted cerumen of right ear (380 4) (H61 21)   15  Leg pain, right (729 5) (M79 604)   16  Need for revaccination (V05 9) (Z23)   17  Orthostatic hypotension (458 0) (I95 1)   18  Osteoporosis (733 00) (M81 0)   19  Other closed displaced fracture of distal end of right humerus, initial encounter (812 49)    (S42 491A)   20  Preoperative clearance (V72 84) (Z01 818)   21  Rheumatoid arthritis (714 0) (M06 9)   22  Right elbow pain (719 42) (M25 521)   23  Right knee pain (719 46) (M25 561)   24  Screening for genitourinary condition (V81 6) (Z13 89)   25  Streptococcus pneumoniae vaccination indicated (V48 89) (Z03 89)   26   Stress incontinence in female (625 6) (N39 3)   27  Visit for screening mammogram (V76 12) (Z12 31)   28  Vitamin D deficiency (268 9) (E55 9)   29  Zenker's diverticulum (530 6) (K22 5)    Immunizations  Influenza --- [facility]: [id number] Bolanos: 25-Nov-2013; Selin Luong: 23-Dec-2014; Series4:  02-Nov-2015   PCV --- Series1: 02-Nov-2015   PPSV --- Series1: 23-Dec-2008     Current Meds   1  Acetaminophen 325 MG Oral Tablet   2  Bimatoprost 0 03 % Ophthalmic Solution   3  Calcium 600 MG Oral Tablet; TAKE 1 TABLET DAILY   4  Dulcolax SUPP   5  Furosemide 40 MG Oral Tablet; TAKE 1 TABLET DAILY AS DIRECTED   6  Glycerin Liquid   7  Klor-Con M10 10 MEQ Oral Tablet Extended Release; take one tablet by mouth daily   8  Milk of Magnesia SUSP   9  Multivitamins TABS; TAKE 1 TABLET DAILY   10  Omeprazole 20 MG Oral Capsule Delayed Release; TAKE 1 CAPSULE DAILY   11  OxyCODONE HCl - 5 MG Oral Capsule   12  Potassium Chloride Jing ER 10 MEQ Oral Tablet Extended Release; take one tablet by    mouth daily   13  Senokot TABS   14  Uloric 40 MG Oral Tablet; TAKE 1 TABLET DAILY AS DIRECTED   15  Vitamin D3 1000 UNIT Oral Tablet; TAKE 2 TABLETS DAILY   16  Vitamin D3 TABS    Allergies    1  Vioxx TABS    2  Shellfish    Future Appointments    Date/Time Provider Specialty Site   08/14/2017 01:10 PM DESMOND Calderon  Orthopedic Surgery [facility] [address] [place] [address]     Signatures   Electronically signed by : Kavon Garcia DO;  Apr 5 2017  8:33PM EST                       (Author)

## 2018-05-01 ENCOUNTER — APPOINTMENT (OUTPATIENT)
Dept: LAB | Facility: HOSPITAL | Age: 83
End: 2018-05-01
Attending: ORTHOPAEDIC SURGERY
Payer: MEDICARE

## 2018-05-01 ENCOUNTER — PREP FOR PROCEDURE (OUTPATIENT)
Dept: OBGYN CLINIC | Facility: CLINIC | Age: 83
End: 2018-05-01

## 2018-05-01 ENCOUNTER — HOSPITAL ENCOUNTER (OUTPATIENT)
Dept: RADIOLOGY | Facility: HOSPITAL | Age: 83
Discharge: HOME | End: 2018-05-01
Attending: ORTHOPAEDIC SURGERY
Payer: MEDICARE

## 2018-05-01 ENCOUNTER — APPOINTMENT (OUTPATIENT)
Dept: RADIOLOGY | Facility: CLINIC | Age: 83
End: 2018-05-01
Payer: MEDICARE

## 2018-05-01 ENCOUNTER — HOSPITAL ENCOUNTER (OUTPATIENT)
Dept: NON INVASIVE DIAGNOSTICS | Facility: HOSPITAL | Age: 83
Discharge: HOME | End: 2018-05-01
Attending: ORTHOPAEDIC SURGERY
Payer: MEDICARE

## 2018-05-01 ENCOUNTER — OFFICE VISIT (OUTPATIENT)
Dept: OBGYN CLINIC | Facility: CLINIC | Age: 83
End: 2018-05-01
Payer: MEDICARE

## 2018-05-01 VITALS — HEART RATE: 80 BPM | SYSTOLIC BLOOD PRESSURE: 130 MMHG | DIASTOLIC BLOOD PRESSURE: 70 MMHG

## 2018-05-01 DIAGNOSIS — S52.022A CLOSED FRACTURE OF OLECRANON PROCESS OF LEFT ULNA, INITIAL ENCOUNTER: ICD-10-CM

## 2018-05-01 DIAGNOSIS — S52.022A CLOSED FRACTURE OF OLECRANON PROCESS OF LEFT ULNA, INITIAL ENCOUNTER: Primary | ICD-10-CM

## 2018-05-01 DIAGNOSIS — M25.522 PAIN IN LEFT ELBOW: ICD-10-CM

## 2018-05-01 LAB
ALBUMIN SERPL BCP-MCNC: 3.3 G/DL (ref 3.5–5)
ALP SERPL-CCNC: 88 U/L (ref 46–116)
ALT SERPL W P-5'-P-CCNC: 21 U/L (ref 12–78)
ANION GAP SERPL CALCULATED.3IONS-SCNC: 12 MMOL/L (ref 4–13)
APTT PPP: 31 SECONDS (ref 23–35)
AST SERPL W P-5'-P-CCNC: 23 U/L (ref 5–45)
BILIRUB SERPL-MCNC: 0.5 MG/DL (ref 0.2–1)
BUN SERPL-MCNC: 33 MG/DL (ref 5–25)
CALCIUM SERPL-MCNC: 9.8 MG/DL (ref 8.3–10.1)
CHLORIDE SERPL-SCNC: 106 MMOL/L (ref 100–108)
CO2 SERPL-SCNC: 23 MMOL/L (ref 21–32)
CREAT SERPL-MCNC: 1.59 MG/DL (ref 0.6–1.3)
GFR SERPL CREATININE-BSD FRML MDRD: 28 ML/MIN/1.73SQ M
GLUCOSE SERPL-MCNC: 101 MG/DL (ref 65–140)
INR PPP: 1.02 (ref 0.86–1.16)
POTASSIUM SERPL-SCNC: 4.4 MMOL/L (ref 3.5–5.3)
PROT SERPL-MCNC: 7.5 G/DL (ref 6.4–8.2)
PROTHROMBIN TIME: 13.2 SECONDS (ref 12.1–14.4)
SODIUM SERPL-SCNC: 141 MMOL/L (ref 136–145)

## 2018-05-01 PROCEDURE — 85610 PROTHROMBIN TIME: CPT

## 2018-05-01 PROCEDURE — 73080 X-RAY EXAM OF ELBOW: CPT

## 2018-05-01 PROCEDURE — 80053 COMPREHEN METABOLIC PANEL: CPT

## 2018-05-01 PROCEDURE — 71046 X-RAY EXAM CHEST 2 VIEWS: CPT

## 2018-05-01 PROCEDURE — 93005 ELECTROCARDIOGRAM TRACING: CPT

## 2018-05-01 PROCEDURE — 36415 COLL VENOUS BLD VENIPUNCTURE: CPT

## 2018-05-01 PROCEDURE — 99213 OFFICE O/P EST LOW 20 MIN: CPT | Performed by: ORTHOPAEDIC SURGERY

## 2018-05-01 PROCEDURE — 85730 THROMBOPLASTIN TIME PARTIAL: CPT

## 2018-05-01 RX ORDER — CHLORHEXIDINE GLUCONATE 4 G/100ML
SOLUTION TOPICAL DAILY PRN
Status: CANCELLED | OUTPATIENT
Start: 2018-05-07

## 2018-05-01 RX ORDER — SODIUM CHLORIDE, SODIUM LACTATE, POTASSIUM CHLORIDE, CALCIUM CHLORIDE 600; 310; 30; 20 MG/100ML; MG/100ML; MG/100ML; MG/100ML
125 INJECTION, SOLUTION INTRAVENOUS CONTINUOUS
Status: CANCELLED | OUTPATIENT
Start: 2018-05-07

## 2018-05-01 NOTE — ASSESSMENT & PLAN NOTE
77-year-old female with a left olecranon process, displaced  I spoke with her and her nephew regarding the injury and what would be expected with and without surgery  I am fairly concerned that she has very poor bone density and may be difficult to have good fixation with a plate or pins  The piece does appear to be at least 50% of the olecranon  I discussed with her that surgical plan would be fixation versus excision of the olecranon piece with fixation of the tendon to the remaining bone  We discussed risks and benefits of each of these issues  I will decide intraoperatively on with the best course of treatment for her is  Will work on medical clearance and plan on scheduling her for surgery on May 7th

## 2018-05-01 NOTE — PROGRESS NOTES
Assessment:     1  Closed fracture of olecranon process of left ulna, initial encounter          Plan:     Problem List Items Addressed This Visit        Musculoskeletal and Integument    Closed fracture of left olecranon process - Primary     80-year-old female with a left olecranon process, displaced  I spoke with her and her nephew regarding the injury and what would be expected with and without surgery  I am fairly concerned that she has very poor bone density and may be difficult to have good fixation with a plate or pins  The piece does appear to be at least 50% of the olecranon  I discussed with her that surgical plan would be fixation versus excision of the olecranon piece with fixation of the tendon to the remaining bone  We discussed risks and benefits of each of these issues  I will decide intraoperatively on with the best course of treatment for her is  Will work on medical clearance and plan on scheduling her for surgery on May 7th  Relevant Orders    XR elbow 3+ vw left    Case request operating room: OPEN REDUCTION W/ INTERNAL FIXATION (ORIF) OF LEFT OLECRANON (Completed)    Comprehensive metabolic panel    Basic metabolic panel    Protime-INR    APTT    Ambulatory referral to Family Practice    EKG 12 lead    XR chest pa & lateral           Patient ID: Ginny Mohs is a 80 y o  female  Chief Complaint:  Left elbow injury    HPI:  80-year-old female who fell when she was backing up to get in a 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  She landed on her left elbow  X-rays were obtained several days later  This is a for she has been seen by Orthopedics for the elbow  She has not been in a splint or brace for it  She notes ecchymosis and swelling in the distal arm with pain around her elbow  She uses a walker to ambulate in usually uses both elbows equally  She had a total elbow replacement done by Dr Claudia Hernandez a while back for a comminuted elbow fracture   Patient is fairly active, she lives at [place], and is right-hand dominant  Patient's medical intake was reviewed        Allergy:  Allergies   Allergen Reactions    Shellfish Allergy     Vioxx [Rofecoxib]        Medications:  all current active meds have been reviewed    Past Medical History:  Past Medical History:   Diagnosis Date    CHF (congestive heart failure) (HCC)     Glaucoma     Muscle weakness        Past Surgical History:  Past Surgical History:   Procedure Laterality Date    ESOPHAGEAL DILATION      EYE SURGERY      cataract bilaterally    HERNIA REPAIR      left inguinal    MS ARTHROPLASTY,ELBOW,TOTAL PROSTH REPL Right 4/19/2016    Procedure: ARTHROPLASTY ELBOW, SPLINT APPLICATION;  Surgeon: Dada Meyers MD;  Location: [facility];  Service: Orthopedics       Family History:  History reviewed  No pertinent family history  Social History:  History   Alcohol Use No     History   Drug Use No     History   Smoking Status    Never Smoker   Smokeless Tobacco    Current User           ROS:  Review of Systems   Constitutional: Negative  HENT: Negative  Eyes: Negative  Respiratory: Negative  Gastrointestinal: Negative  Endocrine: Negative  Genitourinary: Negative  Musculoskeletal: Positive for arthralgias and joint swelling  Skin: Negative  Allergic/Immunologic: Negative  Neurological: Negative  Hematological: Negative  Psychiatric/Behavioral: Negative  Objective:  BP Readings from Last 1 Encounters:   05/01/18 130/70      Wt Readings from Last 1 Encounters:   08/14/17 46 8 kg (103 lb 4 oz)        BMI:   Estimated body mass index is 23 15 kg/m² as calculated from the following:    Height as of 8/14/17: 4' 8" (1 422 m)  Weight as of 8/14/17: 46 8 kg (103 lb 4 oz)  EXAM:   Physical Exam   Constitutional: She is oriented to person, place, and time  She appears well-developed and well-nourished  No distress  HENT:   Head: Normocephalic and atraumatic     Eyes: Right eye exhibits no discharge  Left eye exhibits no discharge  Neck: Normal range of motion  Neck supple  No tracheal deviation present  Cardiovascular: Normal rate and regular rhythm  Pulmonary/Chest: Effort normal and breath sounds normal  No respiratory distress  She exhibits no tenderness  Abdominal: Soft  She exhibits no distension  There is no tenderness  Neurological: She is alert and oriented to person, place, and time  Skin: Skin is warm and dry  She is not diaphoretic  No erythema  Psychiatric: She has a normal mood and affect  Her behavior is normal    Vitals reviewed  Right Elbow Exam     Comments:  Right elbow with functional range of motion, well-healed incision, no swelling or tenderness, stable to varus and valgus stress      Left Elbow Exam     Comments:  Left elbow with significant ecchymosis of the arm and hand, swollen, tender over the olecranon, weakness on active extension, good active flexion limited by pain, stable to gentle varus and valgus stress, brisk cap refill of the fingers, sensation light touch is intact in the median, radial, and ulnar nerve distribution, no open wound              Radiographs:  I have personally reviewed pertinent films in PACS and my interpretation is Displaced, slightly comminuted olecranon fracture

## 2018-05-02 ENCOUNTER — TELEPHONE (OUTPATIENT)
Dept: FAMILY MEDICINE CLINIC | Facility: CLINIC | Age: 83
End: 2018-05-02

## 2018-05-02 LAB
ATRIAL RATE: 71 BPM
P AXIS: 48 DEGREES
PR INTERVAL: 254 MS
QRS AXIS: -10 DEGREES
QRSD INTERVAL: 84 MS
QT INTERVAL: 388 MS
QTC INTERVAL: 421 MS
T WAVE AXIS: 45 DEGREES
VENTRICULAR RATE: 71 BPM

## 2018-05-02 PROCEDURE — 93010 ELECTROCARDIOGRAM REPORT: CPT | Performed by: INTERNAL MEDICINE

## 2018-05-02 RX ORDER — OMEPRAZOLE 20 MG/1
20 CAPSULE, DELAYED RELEASE ORAL DAILY
COMMUNITY
End: 2019-07-22 | Stop reason: DRUGHIGH

## 2018-05-02 NOTE — TELEPHONE ENCOUNTER
CRISTI FROM [facility] CALLED STATING THAT THIS PATIENT'S X-RAY SHOWED THAT SHE HAS A LEFT ELBOW FRACTURE FROM HER RECENT FALL  THEY ARE PLANNING ON DOING AN ORIF ON MONDAY AND THE PATIENT NEEEDS YOUR SURGICAL CLEARANCE  CRISTI'S NUMBER [phone]  PLEASE ADVISE, THANKS

## 2018-05-02 NOTE — PRE-PROCEDURE INSTRUCTIONS
Pre-Surgery Instructions:   Medication Instructions    bimatoprost (LUMIGAN) 0 03 % ophthalmic drops Instructed patient per Anesthesia Guidelines   dorzolamide-timolol (COSOPT) 2-0 5 % ophthalmic solution Instructed patient per Anesthesia Guidelines   febuxostat (ULORIC) 40 mg tablet Instructed patient per Anesthesia Guidelines   furosemide (LASIX) 40 mg tablet Instructed patient per Anesthesia Guidelines   Multiple Vitamins-Minerals (PRESERVISION AREDS 2 PO) Instructed patient per Anesthesia Guidelines   multivitamin (THERAGRAN) TABS Instructed patient per Anesthesia Guidelines   omeprazole (PriLOSEC) 20 mg delayed release capsule Instructed patient per Anesthesia Guidelines   Vitamin D, Cholecalciferol, 1000 UNITS TABS Instructed patient per Anesthesia Guidelines  Pre-op Showering Instructions for Surgery Patients    Before your operation, you play an important role in decreasing your risk for infection by washing with special antiseptic soap  This is an effective way to reduce bacteria on the skin which may help to prevent infections at the surgical site  Please read the following directions in advance  1  In the week before your operation, purchase a 4 ounce bottle of antiseptic soap containing chlorhexidine gluconate (CHG)  4%  Some brand names include: Aplicare®, Endure, and Hibiclens®  The cost is usually less than $5 00   For your convenience, the [employer] carries the soap   It may also be available at your doctors office or pre-admission testing center, and at most retail pharmacies   If you are allergic or sensitive to soaps containing CHG, please let your doctor know so another antiseptic can be suggested   CHG antiseptic soap is for external use only  2   The day before your operation, follow these instructions carefully to get ready   Please clean linens (sheets) on your bed; you should sleep on clean sheets after your evening shower   Get clean towels and washcloth ready - you need enough for 2 showers   Set aside clean underwear, pajamas, and clothing to wear after the showers     Reminders:   DO NOT use any other soap or body rinse on your skin during or after the antiseptic showers   DO NOT use lotion, powder, deodorant, or perfume/aftershave of any kind on your skin after your antiseptic shower   DO NOT shave any body parts in the 24 hours/day before your operation   DO NOT get the antiseptic soap in your eyes, ears, nose, mouth, or vaginal area    3  You will need to shower the night before AND the morning of your surgery  Shower 1:   The first evening before the operation, take the first shower   First, shampoo your hair with regular shampoo and rinse it completely before you use the antiseptic soap  After washing and rinsing your hair, rinse your body   Next, use a clean washcloth to apply the antiseptic soap and wash your body from the neck down to your toes using ½ bottle of the antiseptic soap   You will use the other ½ bottle for the second shower   Clean the area where your incision will be; lather this area well for about 2 minutes   If you are having head or neck surgery, wash areas with the antiseptic soap   Rinse yourself completely with running water   Use a clean towel to dry off   Wear clean underwear and clothing/pajamas  Shower 2   The morning of your operation, take the second shower following the same steps as Shower 1 using the second ½ of the bottle of antiseptic soap   Use clean cloths and towels to wash and dry yourself   Wear clean underwear and clothing

## 2018-05-03 ENCOUNTER — ANESTHESIA EVENT (OUTPATIENT)
Dept: PERIOP | Facility: HOSPITAL | Age: 83
End: 2018-05-03
Payer: MEDICARE

## 2018-05-07 ENCOUNTER — ANESTHESIA (OUTPATIENT)
Dept: PERIOP | Facility: HOSPITAL | Age: 83
End: 2018-05-07
Payer: MEDICARE

## 2018-05-07 ENCOUNTER — HOSPITAL ENCOUNTER (OUTPATIENT)
Facility: HOSPITAL | Age: 83
Setting detail: OUTPATIENT SURGERY
Discharge: HOME | End: 2018-05-07
Attending: ORTHOPAEDIC SURGERY | Admitting: ORTHOPAEDIC SURGERY
Payer: MEDICARE

## 2018-05-07 ENCOUNTER — HOSPITAL ENCOUNTER (OUTPATIENT)
Dept: RADIOLOGY | Facility: HOSPITAL | Age: 83
Discharge: HOME | End: 2018-05-07
Payer: MEDICARE

## 2018-05-07 VITALS
HEART RATE: 64 BPM | SYSTOLIC BLOOD PRESSURE: 165 MMHG | TEMPERATURE: 98 F | HEIGHT: 56 IN | OXYGEN SATURATION: 94 % | DIASTOLIC BLOOD PRESSURE: 71 MMHG | RESPIRATION RATE: 18 BRPM | BODY MASS INDEX: 22.72 KG/M2 | WEIGHT: 101 LBS

## 2018-05-07 DIAGNOSIS — S52.022D CLOSED FRACTURE OF LEFT OLECRANON PROCESS WITH ROUTINE HEALING, SUBSEQUENT ENCOUNTER: Primary | ICD-10-CM

## 2018-05-07 PROCEDURE — C1713 ANCHOR/SCREW BN/BN,TIS/BN: HCPCS | Performed by: ORTHOPAEDIC SURGERY

## 2018-05-07 PROCEDURE — 73080 X-RAY EXAM OF ELBOW: CPT

## 2018-05-07 PROCEDURE — 24685 OPTX ULNAR FX PROX END W/FIX: CPT | Performed by: ORTHOPAEDIC SURGERY

## 2018-05-07 PROCEDURE — 24685 OPTX ULNAR FX PROX END W/FIX: CPT | Performed by: PHYSICIAN ASSISTANT

## 2018-05-07 DEVICE — 3.5MM LOCKING SCREW SLF-TPNG W/STARDRIVE(TM) RECESS 24MM: Type: IMPLANTABLE DEVICE | Site: ELBOW | Status: FUNCTIONAL

## 2018-05-07 DEVICE — 3.5MM LOCKING SCREW SLF-TPNG W/STARDRIVE(TM) RECESS 22MM: Type: IMPLANTABLE DEVICE | Site: ELBOW | Status: FUNCTIONAL

## 2018-05-07 DEVICE — 3.5MM LCP OLECRANON PLATE 2 HOLES/LEFT/86MM
Type: IMPLANTABLE DEVICE | Site: ELBOW | Status: FUNCTIONAL
Brand: LCP

## 2018-05-07 DEVICE — 3.5MM LOCKING SCREW SLF-TPNG W/STARDRIVE(TM) RECESS 40MM: Type: IMPLANTABLE DEVICE | Site: ELBOW | Status: FUNCTIONAL

## 2018-05-07 DEVICE — 3.5MM CORTEX SCREW SELF-TAPPING 18MM: Type: IMPLANTABLE DEVICE | Site: ELBOW | Status: FUNCTIONAL

## 2018-05-07 DEVICE — 3.5MM CORTEX SCREW SELF-TAPPING 24MM: Type: IMPLANTABLE DEVICE | Site: ELBOW | Status: FUNCTIONAL

## 2018-05-07 DEVICE — 3.5MM CORTEX SCREW SELF-TAPPING 20MM: Type: IMPLANTABLE DEVICE | Site: ELBOW | Status: FUNCTIONAL

## 2018-05-07 RX ORDER — FENTANYL CITRATE 50 UG/ML
INJECTION, SOLUTION INTRAMUSCULAR; INTRAVENOUS AS NEEDED
Status: DISCONTINUED | OUTPATIENT
Start: 2018-05-07 | End: 2018-05-07 | Stop reason: SURG

## 2018-05-07 RX ORDER — AMOXICILLIN 500 MG/1
500 CAPSULE ORAL EVERY 8 HOURS SCHEDULED
COMMUNITY
End: 2019-07-25 | Stop reason: HOSPADM

## 2018-05-07 RX ORDER — ONDANSETRON 2 MG/ML
4 INJECTION INTRAMUSCULAR; INTRAVENOUS ONCE AS NEEDED
Status: DISCONTINUED | OUTPATIENT
Start: 2018-05-07 | End: 2018-05-07 | Stop reason: HOSPADM

## 2018-05-07 RX ORDER — CHLORHEXIDINE GLUCONATE 4 G/100ML
SOLUTION TOPICAL DAILY PRN
Status: DISCONTINUED | OUTPATIENT
Start: 2018-05-07 | End: 2018-05-07 | Stop reason: HOSPADM

## 2018-05-07 RX ORDER — ACETAMINOPHEN 325 MG/1
650 TABLET ORAL EVERY 6 HOURS PRN
Status: DISCONTINUED | OUTPATIENT
Start: 2018-05-07 | End: 2018-05-07 | Stop reason: HOSPADM

## 2018-05-07 RX ORDER — ONDANSETRON 2 MG/ML
4 INJECTION INTRAMUSCULAR; INTRAVENOUS EVERY 6 HOURS PRN
Status: DISCONTINUED | OUTPATIENT
Start: 2018-05-07 | End: 2018-05-07 | Stop reason: HOSPADM

## 2018-05-07 RX ORDER — POTASSIUM CHLORIDE 1500 MG/1
TABLET, FILM COATED, EXTENDED RELEASE ORAL DAILY
COMMUNITY
End: 2019-07-25 | Stop reason: HOSPADM

## 2018-05-07 RX ORDER — HYDROCODONE BITARTRATE AND ACETAMINOPHEN 5; 325 MG/1; MG/1
TABLET ORAL
Qty: 20 TABLET | Refills: 0 | Status: SHIPPED | OUTPATIENT
Start: 2018-05-07 | End: 2019-07-25 | Stop reason: HOSPADM

## 2018-05-07 RX ORDER — FENTANYL CITRATE/PF 50 MCG/ML
25 SYRINGE (ML) INJECTION
Status: DISCONTINUED | OUTPATIENT
Start: 2018-05-07 | End: 2018-05-07 | Stop reason: HOSPADM

## 2018-05-07 RX ORDER — MIDAZOLAM HYDROCHLORIDE 1 MG/ML
INJECTION INTRAMUSCULAR; INTRAVENOUS AS NEEDED
Status: DISCONTINUED | OUTPATIENT
Start: 2018-05-07 | End: 2018-05-07 | Stop reason: SURG

## 2018-05-07 RX ORDER — HYDROCODONE BITARTRATE AND ACETAMINOPHEN 5; 325 MG/1; MG/1
1 TABLET ORAL EVERY 6 HOURS PRN
Status: DISCONTINUED | OUTPATIENT
Start: 2018-05-07 | End: 2018-05-07 | Stop reason: HOSPADM

## 2018-05-07 RX ORDER — SODIUM CHLORIDE, SODIUM LACTATE, POTASSIUM CHLORIDE, CALCIUM CHLORIDE 600; 310; 30; 20 MG/100ML; MG/100ML; MG/100ML; MG/100ML
125 INJECTION, SOLUTION INTRAVENOUS CONTINUOUS
Status: DISCONTINUED | OUTPATIENT
Start: 2018-05-07 | End: 2018-05-07 | Stop reason: HOSPADM

## 2018-05-07 RX ADMIN — SODIUM CHLORIDE, SODIUM LACTATE, POTASSIUM CHLORIDE, AND CALCIUM CHLORIDE 125 ML/HR: .6; .31; .03; .02 INJECTION, SOLUTION INTRAVENOUS at 11:43

## 2018-05-07 RX ADMIN — FENTANYL CITRATE 25 MCG: 50 INJECTION, SOLUTION INTRAMUSCULAR; INTRAVENOUS at 12:25

## 2018-05-07 RX ADMIN — FENTANYL CITRATE 25 MCG: 50 INJECTION, SOLUTION INTRAMUSCULAR; INTRAVENOUS at 12:11

## 2018-05-07 RX ADMIN — FENTANYL CITRATE 25 MCG: 50 INJECTION, SOLUTION INTRAMUSCULAR; INTRAVENOUS at 11:45

## 2018-05-07 RX ADMIN — CEFAZOLIN SODIUM 2000 MG: 2 SOLUTION INTRAVENOUS at 12:00

## 2018-05-07 RX ADMIN — MIDAZOLAM HYDROCHLORIDE 0.5 MG: 1 INJECTION, SOLUTION INTRAMUSCULAR; INTRAVENOUS at 12:08

## 2018-05-07 RX ADMIN — MIDAZOLAM HYDROCHLORIDE 0.5 MG: 1 INJECTION, SOLUTION INTRAMUSCULAR; INTRAVENOUS at 12:12

## 2018-05-07 RX ADMIN — FENTANYL CITRATE 25 MCG: 50 INJECTION, SOLUTION INTRAMUSCULAR; INTRAVENOUS at 12:08

## 2018-05-07 RX ADMIN — MIDAZOLAM HYDROCHLORIDE 0.5 MG: 1 INJECTION, SOLUTION INTRAMUSCULAR; INTRAVENOUS at 12:25

## 2018-05-07 RX ADMIN — MIDAZOLAM HYDROCHLORIDE 0.5 MG: 1 INJECTION, SOLUTION INTRAMUSCULAR; INTRAVENOUS at 11:46

## 2018-05-07 NOTE — ANESTHESIA PREPROCEDURE EVALUATION
Review of Systems/Medical History          Cardiovascular  CHF compensated CHF,    Pulmonary       GI/Hepatic    GERD well controlled,             Endo/Other     GYN       Hematology   Musculoskeletal  Rheumatoid arthritis ,   Arthritis     Neurology   Psychology           Physical Exam    Airway    Mallampati score: II  TM Distance: >3 FB  Neck ROM: full     Dental   implants,     Cardiovascular  Rhythm: regular, Rate: normal, Murmur,     Pulmonary  Breath sounds clear to auscultation,     Other Findings        Anesthesia Plan  ASA Score- 3     Anesthesia Type- regional and IV sedation with anesthesia with ASA Monitors  Additional Monitors:   Airway Plan:         Plan Factors-    Induction- intravenous  Postoperative Plan-     Informed Consent- Anesthetic plan and risks discussed with patient

## 2018-05-07 NOTE — DISCHARGE SUMMARY
[facility] Discharge Note  Maria C Ba, 80 y o  female  MRN: [id number]      Preoperative diagnosis: left olecranon fracture    Postoperative diagnosis: left olecranon fracture    Procedure: left olecranon ORIF    After procedure, patient was brought to PACU  Pain was controlled with IV and oral pain medication  Patient was discharged to home after cleared by anesthesia and when criteria was met  Condition: good    Discharge medications:   See after visit summary for reconciled discharge medications provided to patient and family  Please refer to discharge instructions for further information

## 2018-05-07 NOTE — DISCHARGE INSTRUCTIONS
-Keep your splint on and dry until follow-up    -Move your fingers as much as your splint allows  Work on bending and straightening your fingers as much as possible  You may even use your other hand to assist with this stretching      -Keep the extremity that was operated on elevated above the heart as much as possible  It should be down hill from the tips of your fingers to your heart to help reduce the swelling and pain     -Place ice on the operated area for 20 minutes every hour as much as possible  The cold does go through the splint even though it is thick    This will help with pain and swelling     - Do not put any weight through the arm that was operated on

## 2018-05-07 NOTE — ANESTHESIA PROCEDURE NOTES
Peripheral Block    Patient location during procedure: holding area  Start time: 5/7/2018 11:48 AM  Removal time: 5/7/2018 11:58 AM  Reason for block: at surgeon's request and post-op pain management  Staffing  Anesthesiologist: Ck Milligan  Performed: anesthesiologist   Preanesthetic Checklist  Completed: patient identified, site marked, surgical consent, pre-op evaluation, timeout performed, IV checked, risks and benefits discussed and monitors and equipment checked  Peripheral Block  Patient position: supine  Prep: Betadine  Patient monitoring: heart rate, frequent blood pressure checks and continuous pulse ox  Block type: supraclavicular  Laterality: left  Injection technique: single-shot  Procedures: ultrasound guided and nerve stimulator  Ultrasound permanent image saved  Local infiltration: ropivacaine  Infiltration strength: 0 5 %  Dose: 30 mL  Needle  Needle type: Stimuplex   Needle gauge: 22 G  Needle length: 10 cm  Needle localization: nerve stimulator and ultrasound guidance  Test dose: negative  Assessment  Injection assessment: incremental injection, local visualized surrounding nerve on ultrasound and no paresthesia on injection  Heart rate change: no  Slow fractionated injection: yes  Post-procedure:  site cleaned  patient tolerated the procedure well with no immediate complications

## 2018-05-07 NOTE — OP NOTE
Orthopedics OPEN REDUCTION W/ INTERNAL FIXATION (ORIF) OF LEFT OLECRANON  Op Note      Pre-op Diagnosis:   Closed fracture of olecranon process of left ulna    Post-op Diagnosis:  Same    Procedure:  Procedure(s):  OPEN REDUCTION W/ INTERNAL FIXATION (ORIF) OF LEFT OLECRANON    Surgeon:  Surgeon(s):  MD Andriy Anders PA-C   I was present for the entire procedure and A qualified resident physician was not available    Anesthesia:  Regional block with sedation    Tourniquet Time:  None    Estimated Blood Loss:  Minimal    Material sent to lab:  None      Complications:  None    Findings:  Stable olecranon fracture, poor bone quality, full ROM with no crepitus after fixation    Indications:  A 80 y o  y/o female with an left olecranon fracture  Treatment options were discussed, and the recommendation was made for an ORIF  Risks and benefits of surgery were discussed which included but were not limited to continued infection, malunion, nonunion, neurovascular damage, need to return to the OR, failure of the implants, symptomatic hardware, wound healing problems, stiffness, infection  Informed consent was obtained  Procedure: The patient was met preoperatively and the left elbow was identified and signed  The patient was taken back to the operating room where a Interscalene block was performed  The extremity was sterilely prepped and draped in the usual fashion  A timeout was held identifying the patient, side, site, antibiotics, and allergies  The patient received Ancef  preoperatively  A longitudinal incision was made posteriorly directly over the olecranon  Dissected down to the fascial layer, elevating skin flaps  Fracture site was easily identified  Joint itself was debrided out along with the hematoma and washed  This point a two point reduction clamp was used to reduce the fracture site  Fluoroscopy was brought in and revealed an adequate reduction    I chose a [employer]ynthes proximal ulnar locking plate, and secured this proximally and distally with nonlocking screws  Fluoroscopy again confirmed adequate reduction and placement of the plate  The rest the proximal screw holes with locking screws in two more screws distally  Patient's elbow had excellent range of motion with no crepitus  The elbow was stable to varus and valgus stress  I was able to flex her to 120° with no significant tension on the fracture site  Due to the poor bone quality, and the proximal nature of the fracture I did want to reinforce the fracture site  A drill was placed across the fracture on the ulnar aspect of the ulna  Through this drill hole fiberwire was placed into loops and tied down to reinforce the repair  The area was thoroughly irrigated out  Deep tissue was closed with 0 Vicryl, subcu skin was closed with two O Vicryl and Monocryl  Steri-Strips and sterile dressing were placed  Patient was placed in a bulky splint  Disposition:  Patient tolerated the procedure well and was taken to recovery postoperatively  Plan:  - Patient will be NWB  - Keep dressing c/d/i  - Elevate the extremity  - x-rays at two weeks postoperatively  - anticipate transition to a hinged elbow brace at two weeks    - follow-up in two weeks    @[facility]@     Date: 5/7/2018  Time: 1:34 PM

## 2018-05-07 NOTE — H&P (VIEW-ONLY)
Assessment:     1  Closed fracture of olecranon process of left ulna, initial encounter          Plan:     Problem List Items Addressed This Visit        Musculoskeletal and Integument    Closed fracture of left olecranon process - Primary     55-year-old female with a left olecranon process, displaced  I spoke with her and her nephew regarding the injury and what would be expected with and without surgery  I am fairly concerned that she has very poor bone density and may be difficult to have good fixation with a plate or pins  The piece does appear to be at least 50% of the olecranon  I discussed with her that surgical plan would be fixation versus excision of the olecranon piece with fixation of the tendon to the remaining bone  We discussed risks and benefits of each of these issues  I will decide intraoperatively on with the best course of treatment for her is  Will work on medical clearance and plan on scheduling her for surgery on May 7th  Relevant Orders    XR elbow 3+ vw left    Case request operating room: OPEN REDUCTION W/ INTERNAL FIXATION (ORIF) OF LEFT OLECRANON (Completed)    Comprehensive metabolic panel    Basic metabolic panel    Protime-INR    APTT    Ambulatory referral to Family Practice    EKG 12 lead    XR chest pa & lateral           Patient ID: Sondra Deleon is a 80 y o  female  Chief Complaint:  Left elbow injury    HPI:  55-year-old female who fell when she was backing up to get in a chair 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  She landed on her left elbow  X-rays were obtained several days later  This is a for she has been seen by Orthopedics for the elbow  She has not been in a splint or brace for it  She notes ecchymosis and swelling in the distal arm with pain around her elbow  She uses a walker to ambulate in usually uses both elbows equally  She had a total elbow replacement done by Dr Chantell Mcintosh a while back for a comminuted elbow fracture   Patient is fairly active, she lives at [facility], and is right-hand dominant  Patient's medical intake was reviewed        Allergy:  Allergies   Allergen Reactions    Shellfish Allergy     Vioxx [Rofecoxib]        Medications:  all current active meds have been reviewed    Past Medical History:  Past Medical History:   Diagnosis Date    CHF (congestive heart failure) (HCC)     Glaucoma     Muscle weakness        Past Surgical History:  Past Surgical History:   Procedure Laterality Date    ESOPHAGEAL DILATION      EYE SURGERY      cataract bilaterally    HERNIA REPAIR      left inguinal    CT ARTHROPLASTY,ELBOW,TOTAL PROSTH REPL Right 4/19/2016    Procedure: ARTHROPLASTY ELBOW, SPLINT APPLICATION;  Surgeon: Sandy Lund MD;  [facility]: [facility];  Service: Orthopedics       Family History:  History reviewed  No pertinent family history  Social History:  History   Alcohol Use No     History   Drug Use No     History   Smoking Status    Never Smoker   Smokeless Tobacco    Current User           ROS:  Review of Systems   Constitutional: Negative  HENT: Negative  Eyes: Negative  Respiratory: Negative  Gastrointestinal: Negative  Endocrine: Negative  Genitourinary: Negative  Musculoskeletal: Positive for arthralgias and joint swelling  Skin: Negative  Allergic/Immunologic: Negative  Neurological: Negative  Hematological: Negative  Psychiatric/Behavioral: Negative  Objective:  BP Readings from Last 1 Encounters:   05/01/18 130/70      Wt Readings from Last 1 Encounters:   08/14/17 46 8 kg (103 lb 4 oz)        BMI:   Estimated body mass index is 23 15 kg/m² as calculated from the following:    Height as of 8/14/17: 4' 8" (1 422 m)  Weight as of 8/14/17: 46 8 kg (103 lb 4 oz)  EXAM:   Physical Exam   Constitutional: She is oriented to person, place, and time  She appears well-developed and well-nourished  No distress  HENT:   Head: Normocephalic and atraumatic     Eyes: Right eye exhibits no discharge  Left eye exhibits no discharge  Neck: Normal range of motion  Neck supple  No tracheal deviation present  Cardiovascular: Normal rate and regular rhythm  Pulmonary/Chest: Effort normal and breath sounds normal  No respiratory distress  She exhibits no tenderness  Abdominal: Soft  She exhibits no distension  There is no tenderness  Neurological: She is alert and oriented to person, place, and time  Skin: Skin is warm and dry  She is not diaphoretic  No erythema  Psychiatric: She has a normal mood and affect  Her behavior is normal    Vitals reviewed  Right Elbow Exam     Comments:  Right elbow with functional range of motion, well-healed incision, no swelling or tenderness, stable to varus and valgus stress      Left Elbow Exam     Comments:  Left elbow with significant ecchymosis of the arm and hand, swollen, tender over the olecranon, weakness on active extension, good active flexion limited by pain, stable to gentle varus and valgus stress, brisk cap refill of the fingers, sensation light touch is intact in the median, radial, and ulnar nerve distribution, no open wound              Radiographs:  I have personally reviewed pertinent films in PACS and my interpretation is Displaced, slightly comminuted olecranon fracture

## 2018-05-08 ENCOUNTER — TELEPHONE (OUTPATIENT)
Dept: OBGYN CLINIC | Facility: HOSPITAL | Age: 83
End: 2018-05-08

## 2018-05-08 NOTE — TELEPHONE ENCOUNTER
Caller: Pablo Wagner  Callback# [phone]  Dr Carola Hinton          Patient was discharged to home and being transferred to skilled nursing  Thanks

## 2018-05-22 ENCOUNTER — OFFICE VISIT (OUTPATIENT)
Dept: OBGYN CLINIC | Facility: CLINIC | Age: 83
End: 2018-05-22

## 2018-05-22 ENCOUNTER — APPOINTMENT (OUTPATIENT)
Dept: RADIOLOGY | Facility: CLINIC | Age: 83
End: 2018-05-22
Payer: MEDICARE

## 2018-05-22 VITALS — HEART RATE: 86 BPM | DIASTOLIC BLOOD PRESSURE: 80 MMHG | SYSTOLIC BLOOD PRESSURE: 117 MMHG

## 2018-05-22 DIAGNOSIS — S52.022D CLOSED FRACTURE OF LEFT OLECRANON PROCESS WITH ROUTINE HEALING, SUBSEQUENT ENCOUNTER: ICD-10-CM

## 2018-05-22 DIAGNOSIS — S52.022D CLOSED FRACTURE OF LEFT OLECRANON PROCESS WITH ROUTINE HEALING, SUBSEQUENT ENCOUNTER: Primary | ICD-10-CM

## 2018-05-22 PROCEDURE — 99024 POSTOP FOLLOW-UP VISIT: CPT | Performed by: ORTHOPAEDIC SURGERY

## 2018-05-22 PROCEDURE — 73080 X-RAY EXAM OF ELBOW: CPT

## 2018-05-22 NOTE — ASSESSMENT & PLAN NOTE
Patient is doing well status post left olecranon ORIF on May 7, 2018  She was given a prescription for hinged elbow brace unlocked  She may remove it for showering and dressing  She was also given a new prescription for a platform attachment for her walker  She may bear weight through the forearm, but not through the hand  Follow up in 4 weeks with new x-rays  All questions were answered to patient's satisfaction

## 2018-05-22 NOTE — PROGRESS NOTES
Assessment:     1  Closed fracture of left olecranon process with routine healing, subsequent encounter        Plan:  The patient was seen and examined by Dr Ct Kunz and myself  Problem List Items Addressed This Visit        Musculoskeletal and Integument    Closed fracture of left olecranon process - Primary     Patient is doing well status post left olecranon ORIF on May 7, 2018  She was given a prescription for hinged elbow brace unlocked  She may remove it for showering and dressing  She was also given a new prescription for a platform attachment for her walker  She may bear weight through the forearm, but not through the hand  Follow up in 4 weeks with new x-rays  All questions were answered to patient's satisfaction  Relevant Orders    XR elbow 3+ vw left    Elbow Rom Brace    Durable Medical Equipment         Subjective:     Patient ID: Shanell Pa is a 80 y o  female  Chief Complaint: This is a 51-year-old female who is status post left olecranon ORIF on May 7, 2018  She is currently residing in a rehab facility  She has been nonweightbearing to left upper extremity  She tolerated the splint well  She denies any pain at this time        Allergy:  Allergies   Allergen Reactions    Shellfish Allergy      Pt has gout    Vioxx [Rofecoxib]      Medications:  all current active meds have been reviewed  Past Medical History:  Past Medical History:   Diagnosis Date    Arthritis     [facility]) [facility]     Glaucoma     Gout     Macular degeneration     Muscle weakness     RA [facility]rheumatoid arthritis) ([facility] [address] )     Wound healing, delayed     pt states chronic wound on left shoulder     Past Surgical History:  Past Surgical History:   Procedure Laterality Date    COLONOSCOPY      ESOPHAGEAL DILATION      EYE SURGERY      cataract bilaterally    HERNIA REPAIR      left inguinal    DE ARTHROPLASTY,ELBOW,TOTAL PROSTH REPL Right 4/19/2016    Procedure: ARTHROPLASTY ELBOW, SPLINT APPLICATION;  Surgeon: Jack Barrientos MD;  Location:  MAIN OR;  Service: Orthopedics    NH OPEN [address] Left 5/7/2018    Procedure: OPEN REDUCTION W/ INTERNAL FIXATION (ORIF) OF LEFT OLECRANON;  Surgeon: Jamaal Almaraz MD;  Location:  MAIN OR;  Service: Orthopedics     Family History:  History reviewed  No pertinent family history  Social History:  History   Alcohol Use No     History   Drug Use No     History   Smoking Status    Never Smoker   Smokeless Tobacco    Current User     Review of Systems      Objective:  BP Readings from Last 1 Encounters:   05/22/18 117/80      Wt Readings from Last 1 Encounters:   05/07/18 45 8 kg (101 lb)      BMI:   Estimated body mass index is 22 64 kg/m² as calculated from the following:    Height as of 5/7/18: 4' 8" (1 422 m)  Weight as of 5/7/18: 45 8 kg (101 lb)  BSA:   Estimated body surface area is 1 33 meters squared as calculated from the following:    Height as of 5/7/18: 4' 8" (1 422 m)  Weight as of 5/7/18: 45 8 kg (101 lb)  Physical Exam  Right Elbow Exam   Right elbow exam is normal       Left Elbow Exam     Range of Motion   Extension: 20   Flexion: 100     Other   Erythema: absent  Sensation: normal  Pulse: present    Comments:  Healing incision with no evidence of infection  Moving wrist and fingers  Pulses 2+              X-rays left elbow reveal a well-aligned olecranon fracture with hardware intact

## 2018-05-23 ENCOUNTER — TELEPHONE (OUTPATIENT)
Dept: OBGYN CLINIC | Facility: HOSPITAL | Age: 83
End: 2018-05-23

## 2018-05-23 NOTE — TELEPHONE ENCOUNTER
Jamal called from [facility]  He is wondering if the brace that was ordered for the pt is going to be delivered to them or is someone going out there for a consult, or are they responsible for getting the brace   He can be reached at [phone]

## 2018-05-23 NOTE — TELEPHONE ENCOUNTER
They were supposed to get the brace from [facility]  Is there a way to have someone go to her to have one fitted?

## 2018-05-23 NOTE — TELEPHONE ENCOUNTER
Talked to [facility] from [address] [place] [address] from [place] (the [place] [address]) will go to [facility] to fit her  Alem Blanton was called and given Nehemiah's phone number ([phone])  Alem Blanton found a temporary brace to use until she gets the new one

## 2018-06-19 ENCOUNTER — OFFICE VISIT (OUTPATIENT)
Dept: OBGYN CLINIC | Facility: CLINIC | Age: 83
End: 2018-06-19

## 2018-06-19 ENCOUNTER — APPOINTMENT (OUTPATIENT)
Dept: RADIOLOGY | Facility: CLINIC | Age: 83
End: 2018-06-19
Payer: MEDICARE

## 2018-06-19 VITALS
BODY MASS INDEX: 22.91 KG/M2 | WEIGHT: 102.2 LBS | DIASTOLIC BLOOD PRESSURE: 68 MMHG | HEART RATE: 75 BPM | SYSTOLIC BLOOD PRESSURE: 161 MMHG

## 2018-06-19 DIAGNOSIS — S52.022D CLOSED FRACTURE OF LEFT OLECRANON PROCESS WITH ROUTINE HEALING, SUBSEQUENT ENCOUNTER: ICD-10-CM

## 2018-06-19 DIAGNOSIS — S52.022D CLOSED FRACTURE OF LEFT OLECRANON PROCESS WITH ROUTINE HEALING, SUBSEQUENT ENCOUNTER: Primary | ICD-10-CM

## 2018-06-19 PROCEDURE — 99024 POSTOP FOLLOW-UP VISIT: CPT | Performed by: ORTHOPAEDIC SURGERY

## 2018-06-19 PROCEDURE — 73080 X-RAY EXAM OF ELBOW: CPT

## 2018-06-19 RX ORDER — OMEPRAZOLE 40 MG/1
CAPSULE, DELAYED RELEASE ORAL EVERY MORNING
COMMUNITY
Start: 2018-05-18

## 2018-06-19 RX ORDER — POTASSIUM CHLORIDE 20MEQ/15ML
LIQUID (ML) ORAL
COMMUNITY
Start: 2018-05-12 | End: 2019-07-25 | Stop reason: HOSPADM

## 2018-06-19 RX ORDER — POTASSIUM CHLORIDE 20 MEQ/1
TABLET, EXTENDED RELEASE ORAL DAILY
COMMUNITY
Start: 2018-04-01 | End: 2019-07-25 | Stop reason: HOSPADM

## 2018-06-19 NOTE — ASSESSMENT & PLAN NOTE
Patient is doing well status post left olecranon ORIF on May 7, 2018  She will continue her hinged elbow brace unlocked  She may remove it for showering and dressing  She may bear weight through the forearm, but not through the hand  Follow up in 6 weeks with new x-rays  All questions were answered to patient's satisfaction

## 2018-06-19 NOTE — PROGRESS NOTES
Assessment:     1  Closed fracture of left olecranon process with routine healing, subsequent encounter        Plan:     Problem List Items Addressed This Visit        Musculoskeletal and Integument    Closed fracture of left olecranon process - Primary     Patient is doing well status post left olecranon ORIF on May 7, 2018  She will continue her hinged elbow brace unlocked  She may remove it for showering and dressing  She may bear weight through the forearm, but not through the hand  Follow up in 6 weeks with new x-rays  All questions were answered to patient's satisfaction  Relevant Orders    XR elbow 3+ vw left         Subjective:     Patient ID: Jose Chu is a 80 y o  female  Chief Complaint: This is a 24-year-old female who is status post left olecranon ORIF on May 7, 2018  She is currently residing in a rehab facility  She has been nonweightbearing to left upper extremity  She is doing well with her elbow brace  She has a platform walker and has been getting around well with this        Allergy:  Allergies   Allergen Reactions    Shellfish Allergy      Pt has gout    Vioxx [Rofecoxib]      Medications:  all current active meds have been reviewed  Past Medical History:  Past Medical History:   Diagnosis Date    Arthritis     [facility]     Glaucoma     Gout     Macular degeneration     Muscle weakness     [facility]he[facility]oid arthritis) [facility]     Wound healing, delayed     pt states chronic wound on left shoulder     Past Surgical History:  Past Surgical History:   Procedure Laterality Date    COLONOSCOPY      ESOPHAGEAL DILATION      EYE SURGERY      cataract bilaterally    HERNIA REPAIR      left inguinal    [place] ARTHROPLASTY,ELBOW,TOTAL PROSTH REPL Right 4/19/2016    Procedure: ARTHROPLASTY ELBOW, SPLINT APPLICATION;  Surgeon: Kathryn Bucio MD;  [facility];  Service: Orthopedics    [place] OPEN [facility] Left 5/7/2018    Procedure: OPEN REDUCTION W/ INTERNAL FIXATION (ORIF) OF LEFT OLECRANON;  Surgeon: Nhung Mejía MD;  Location: [facility] OR;  Service: Orthopedics     Family History:  No family history on file  Social History:  History   Alcohol Use No     History   Drug Use No     History   Smoking Status    Never Smoker   Smokeless Tobacco    Current User     Review of Systems      Objective:  BP Readings from Last 1 Encounters:   06/19/18 161/68      Wt Readings from Last 1 Encounters:   06/19/18 46 4 kg (102 lb 3 2 oz)      BMI:   Estimated body mass index is 22 91 kg/m² as calculated from the following:    Height as of 5/7/18: 4' 8" (1 422 m)  Weight as of this encounter: 46 4 kg (102 lb 3 2 oz)  BSA:   Estimated body surface area is 1 34 meters squared as calculated from the following:    Height as of 5/7/18: 4' 8" (1 422 m)  Weight as of this encounter: 46 4 kg (102 lb 3 2 oz)  Physical Exam  Right Elbow Exam   Right elbow exam is normal       Left Elbow Exam     Range of Motion   Extension: 20   Flexion: 100     Other   Erythema: absent  Sensation: normal  Pulse: present    Comments:  Healing incision with no evidence of infection  Moving wrist and fingers  Pulses 2+  Active range of motion of the elbow is 10-90 degrees with no pain              X-rays left elbow reveal a well-aligned olecranon fracture with hardware intact   there does appear to be some interval healing, no displacement of the implants

## 2018-07-31 ENCOUNTER — OFFICE VISIT (OUTPATIENT)
Dept: OBGYN CLINIC | Facility: CLINIC | Age: 83
End: 2018-07-31

## 2018-07-31 ENCOUNTER — APPOINTMENT (OUTPATIENT)
Dept: RADIOLOGY | Facility: CLINIC | Age: 83
End: 2018-07-31
Payer: MEDICARE

## 2018-07-31 VITALS
WEIGHT: 100 LBS | HEIGHT: 56 IN | DIASTOLIC BLOOD PRESSURE: 64 MMHG | HEART RATE: 72 BPM | BODY MASS INDEX: 22.5 KG/M2 | SYSTOLIC BLOOD PRESSURE: 137 MMHG

## 2018-07-31 DIAGNOSIS — S52.022D CLOSED FRACTURE OF LEFT OLECRANON PROCESS WITH ROUTINE HEALING, SUBSEQUENT ENCOUNTER: Primary | ICD-10-CM

## 2018-07-31 DIAGNOSIS — S52.022D CLOSED FRACTURE OF LEFT OLECRANON PROCESS WITH ROUTINE HEALING, SUBSEQUENT ENCOUNTER: ICD-10-CM

## 2018-07-31 PROCEDURE — 99024 POSTOP FOLLOW-UP VISIT: CPT | Performed by: ORTHOPAEDIC SURGERY

## 2018-07-31 PROCEDURE — 73080 X-RAY EXAM OF ELBOW: CPT

## 2018-07-31 NOTE — ASSESSMENT & PLAN NOTE
Patient is doing well status post left olecranon ORIF on May 7, 2018  She is doing excellent  She will continue working on her range of motion  She may gradually progress activities as tolerated with no restrictions  She is not yet ready to wean off the platform walker, but was encouraged that when she is ready she may do so  She may follow up with me on an as-needed basis

## 2018-07-31 NOTE — PROGRESS NOTES
Assessment:     1  Closed fracture of left olecranon process with routine healing, subsequent encounter          Plan:     Problem List Items Addressed This Visit        Musculoskeletal and Integument    Closed fracture of left olecranon process - Primary     Patient is doing well status post left olecranon ORIF on May 7, 2018  She is doing excellent  She will continue working on her range of motion  She may gradually progress activities as tolerated with no restrictions  She is not yet ready to wean off the platform walker, but was encouraged that when she is ready she may do so  She may follow up with me on an as-needed basis  Relevant Orders    XR elbow 3+ vw left           Patient ID: Derick Snyder is a 80 y o  female  Chief Complaint:  Left elbow     Subjective:  Patient is 11weeks status post left olecranon ORIF  She has a platform walker and is able to walk around better  Patient is here with her nephew in the room today  Patient states she is doing better  Patient denies any pain, numbness or tingling  Patient is not taking any medications for pain  Allergy:  Allergies   Allergen Reactions    Shellfish Allergy      Pt has gout    Vioxx [Rofecoxib]        Medications:  all current active meds have been reviewed    ROS:  Review of Systems   Constitutional: Negative for chills, fever and unexpected weight change  HENT: Negative for hearing loss, nosebleeds and postnasal drip  Eyes: Negative for pain, redness and visual disturbance  Respiratory: Negative for cough, shortness of breath and wheezing  Cardiovascular: Negative for chest pain, palpitations and leg swelling  Gastrointestinal: Negative for abdominal pain, nausea and vomiting  Endocrine: Negative for polydipsia and polyuria  Genitourinary: Negative for dysuria  Skin: Negative for rash and wound  Neurological: Negative for dizziness, numbness and headaches     Psychiatric/Behavioral: Negative for decreased concentration and suicidal ideas  The patient is not nervous/anxious  Objective:  BP Readings from Last 1 Encounters:   07/31/18 137/64      Wt Readings from Last 1 Encounters:   07/31/18 45 4 kg (100 lb)        Exam:   Physical Exam   Constitutional: She is oriented to person, place, and time  She appears well-developed and well-nourished  HENT:   Right Ear: External ear normal    Left Ear: External ear normal    Eyes: Conjunctivae and EOM are normal  Pupils are equal, round, and reactive to light  Neck: Normal range of motion  Cardiovascular: Normal rate and regular rhythm  Pulmonary/Chest: Effort normal    Neurological: She is alert and oriented to person, place, and time  Skin: Skin is warm and dry  Psychiatric: She has a normal mood and affect  Her behavior is normal  Thought content normal      Left Elbow Exam     Other   Erythema: absent  Sensation: normal  Pulse: present    Comments:  ROM 20/120  Supination and Pronation normal   Incision site healing well, no swelling   No tenderness              Radiographs:  I have personally reviewed pertinent films in PACS and my interpretation is Well-aligned olecranon fracture with interval healing  No evidence of loosening or failure of fixation       Scribe Attestation    I,:   [facility] am acting as a scribe while in the presence of the attending physician :        I,:   Tyler Rios MD personally performed the services described in this documentation    as scribed in my presence :

## 2019-07-22 ENCOUNTER — APPOINTMENT (EMERGENCY)
Dept: RADIOLOGY | Facility: HOSPITAL | Age: 84
DRG: 811 | End: 2019-07-22
Payer: MEDICARE

## 2019-07-22 ENCOUNTER — OFFICE VISIT (OUTPATIENT)
Dept: GASTROENTEROLOGY | Facility: CLINIC | Age: 84
End: 2019-07-22
Payer: MEDICARE

## 2019-07-22 ENCOUNTER — HOSPITAL ENCOUNTER (INPATIENT)
Facility: HOSPITAL | Age: 84
LOS: 3 days | Discharge: NURSING, REHAB OR LONG-TERM CARE | DRG: 811 | End: 2019-07-25
Attending: EMERGENCY MEDICINE | Admitting: INTERNAL MEDICINE
Payer: MEDICARE

## 2019-07-22 VITALS — BODY MASS INDEX: 19.12 KG/M2 | HEIGHT: 56 IN | WEIGHT: 85 LBS

## 2019-07-22 DIAGNOSIS — J69.0 ASPIRATION PNEUMONIA OF RIGHT LUNG, UNSPECIFIED ASPIRATION PNEUMONIA TYPE, UNSPECIFIED PART OF LUNG (HCC): ICD-10-CM

## 2019-07-22 DIAGNOSIS — K22.5 ZENKER'S (HYPOPHARYNGEAL) DIVERTICULUM: ICD-10-CM

## 2019-07-22 DIAGNOSIS — K62.5 RECTAL BLEEDING: ICD-10-CM

## 2019-07-22 DIAGNOSIS — D64.9 ANEMIA: Primary | ICD-10-CM

## 2019-07-22 DIAGNOSIS — Z12.11 COLON CANCER SCREENING: ICD-10-CM

## 2019-07-22 DIAGNOSIS — R13.10 DYSPHAGIA, UNSPECIFIED TYPE: ICD-10-CM

## 2019-07-22 DIAGNOSIS — R13.10 DYSPHAGIA, UNSPECIFIED TYPE: Primary | ICD-10-CM

## 2019-07-22 DIAGNOSIS — J18.9 HEALTHCARE-ASSOCIATED PNEUMONIA: ICD-10-CM

## 2019-07-22 DIAGNOSIS — R13.10 DIFFICULTY SWALLOWING SOLIDS: ICD-10-CM

## 2019-07-22 PROBLEM — N18.4 CKD (CHRONIC KIDNEY DISEASE) STAGE 4, GFR 15-29 ML/MIN (HCC): Chronic | Status: ACTIVE | Noted: 2019-07-22

## 2019-07-22 PROBLEM — N18.4 CKD (CHRONIC KIDNEY DISEASE) STAGE 4, GFR 15-29 ML/MIN (HCC): Status: ACTIVE | Noted: 2019-07-22

## 2019-07-22 PROBLEM — N39.0 UTI (URINARY TRACT INFECTION): Status: ACTIVE | Noted: 2019-07-22

## 2019-07-22 LAB
ABO GROUP BLD: NORMAL
ALBUMIN SERPL BCP-MCNC: 2 G/DL (ref 3.5–5)
ALP SERPL-CCNC: 101 U/L (ref 46–116)
ALT SERPL W P-5'-P-CCNC: 16 U/L (ref 12–78)
ANION GAP SERPL CALCULATED.3IONS-SCNC: 7 MMOL/L (ref 4–13)
ANION GAP SERPL CALCULATED.3IONS-SCNC: 8 MMOL/L (ref 4–13)
APTT PPP: 29 SECONDS (ref 23–37)
AST SERPL W P-5'-P-CCNC: 34 U/L (ref 5–45)
BACTERIA UR QL AUTO: ABNORMAL /HPF
BASOPHILS # BLD AUTO: 0.02 THOUSANDS/ΜL (ref 0–0.1)
BASOPHILS NFR BLD AUTO: 0 % (ref 0–1)
BILIRUB SERPL-MCNC: 0.3 MG/DL (ref 0.2–1)
BILIRUB UR QL STRIP: NEGATIVE
BLD GP AB SCN SERPL QL: NEGATIVE
BUN SERPL-MCNC: 27 MG/DL (ref 5–25)
BUN SERPL-MCNC: 27 MG/DL (ref 5–25)
CALCIUM SERPL-MCNC: 9.3 MG/DL (ref 8.3–10.1)
CALCIUM SERPL-MCNC: 9.6 MG/DL (ref 8.3–10.1)
CHLORIDE SERPL-SCNC: 106 MMOL/L (ref 100–108)
CHLORIDE SERPL-SCNC: 106 MMOL/L (ref 100–108)
CLARITY UR: ABNORMAL
CO2 SERPL-SCNC: 22 MMOL/L (ref 21–32)
CO2 SERPL-SCNC: 24 MMOL/L (ref 21–32)
COLOR UR: YELLOW
CREAT SERPL-MCNC: 1.48 MG/DL (ref 0.6–1.3)
CREAT SERPL-MCNC: 1.48 MG/DL (ref 0.6–1.3)
EOSINOPHIL # BLD AUTO: 0.14 THOUSAND/ΜL (ref 0–0.61)
EOSINOPHIL NFR BLD AUTO: 1 % (ref 0–6)
ERYTHROCYTE [DISTWIDTH] IN BLOOD BY AUTOMATED COUNT: 18.2 % (ref 11.6–15.1)
GFR SERPL CREATININE-BSD FRML MDRD: 30 ML/MIN/1.73SQ M
GFR SERPL CREATININE-BSD FRML MDRD: 30 ML/MIN/1.73SQ M
GLUCOSE SERPL-MCNC: 99 MG/DL (ref 65–140)
GLUCOSE SERPL-MCNC: 99 MG/DL (ref 65–140)
GLUCOSE UR STRIP-MCNC: NEGATIVE MG/DL
HCT VFR BLD AUTO: 29.1 % (ref 34.8–46.1)
HGB BLD-MCNC: 9 G/DL (ref 11.5–15.4)
HGB UR QL STRIP.AUTO: NEGATIVE
IMM GRANULOCYTES # BLD AUTO: 0.08 THOUSAND/UL (ref 0–0.2)
IMM GRANULOCYTES NFR BLD AUTO: 1 % (ref 0–2)
INR PPP: 1.14 (ref 0.84–1.19)
KETONES UR STRIP-MCNC: NEGATIVE MG/DL
L PNEUMO1 AG UR QL IA.RAPID: NEGATIVE
LEUKOCYTE ESTERASE UR QL STRIP: ABNORMAL
LYMPHOCYTES # BLD AUTO: 2.21 THOUSANDS/ΜL (ref 0.6–4.47)
LYMPHOCYTES NFR BLD AUTO: 17 % (ref 14–44)
MCH RBC QN AUTO: 26.5 PG (ref 26.8–34.3)
MCHC RBC AUTO-ENTMCNC: 30.9 G/DL (ref 31.4–37.4)
MCV RBC AUTO: 86 FL (ref 82–98)
MONOCYTES # BLD AUTO: 0.99 THOUSAND/ΜL (ref 0.17–1.22)
MONOCYTES NFR BLD AUTO: 8 % (ref 4–12)
NEUTROPHILS # BLD AUTO: 9.8 THOUSANDS/ΜL (ref 1.85–7.62)
NEUTS SEG NFR BLD AUTO: 73 % (ref 43–75)
NITRITE UR QL STRIP: NEGATIVE
NON-SQ EPI CELLS URNS QL MICRO: ABNORMAL /HPF
NRBC BLD AUTO-RTO: 0 /100 WBCS
PH UR STRIP.AUTO: 5.5 [PH]
PLATELET # BLD AUTO: 408 THOUSANDS/UL (ref 149–390)
PMV BLD AUTO: 10.4 FL (ref 8.9–12.7)
POTASSIUM SERPL-SCNC: 4.3 MMOL/L (ref 3.5–5.3)
POTASSIUM SERPL-SCNC: 5.4 MMOL/L (ref 3.5–5.3)
PROCALCITONIN SERPL-MCNC: 0.26 NG/ML
PROT SERPL-MCNC: 7.1 G/DL (ref 6.4–8.2)
PROT UR STRIP-MCNC: ABNORMAL MG/DL
PROTHROMBIN TIME: 14.3 SECONDS (ref 11.6–14.5)
RBC # BLD AUTO: 3.39 MILLION/UL (ref 3.81–5.12)
RBC #/AREA URNS AUTO: ABNORMAL /HPF
RH BLD: POSITIVE
S PNEUM AG UR QL: NEGATIVE
SODIUM SERPL-SCNC: 135 MMOL/L (ref 136–145)
SODIUM SERPL-SCNC: 138 MMOL/L (ref 136–145)
SP GR UR STRIP.AUTO: 1.01 (ref 1–1.03)
SPECIMEN EXPIRATION DATE: NORMAL
TROPONIN I SERPL-MCNC: 0.03 NG/ML
UROBILINOGEN UR QL STRIP.AUTO: 0.2 E.U./DL
WBC # BLD AUTO: 13.24 THOUSAND/UL (ref 4.31–10.16)
WBC #/AREA URNS AUTO: ABNORMAL /HPF

## 2019-07-22 PROCEDURE — 99284 EMERGENCY DEPT VISIT MOD MDM: CPT | Performed by: PHYSICIAN ASSISTANT

## 2019-07-22 PROCEDURE — 99223 1ST HOSP IP/OBS HIGH 75: CPT | Performed by: NURSE PRACTITIONER

## 2019-07-22 PROCEDURE — 99285 EMERGENCY DEPT VISIT HI MDM: CPT

## 2019-07-22 PROCEDURE — 80053 COMPREHEN METABOLIC PANEL: CPT | Performed by: PHYSICIAN ASSISTANT

## 2019-07-22 PROCEDURE — 99203 OFFICE O/P NEW LOW 30 MIN: CPT | Performed by: NURSE PRACTITIONER

## 2019-07-22 PROCEDURE — 81001 URINALYSIS AUTO W/SCOPE: CPT | Performed by: PHYSICIAN ASSISTANT

## 2019-07-22 PROCEDURE — 86900 BLOOD TYPING SEROLOGIC ABO: CPT | Performed by: PHYSICIAN ASSISTANT

## 2019-07-22 PROCEDURE — 85610 PROTHROMBIN TIME: CPT | Performed by: PHYSICIAN ASSISTANT

## 2019-07-22 PROCEDURE — 99223 1ST HOSP IP/OBS HIGH 75: CPT | Performed by: INTERNAL MEDICINE

## 2019-07-22 PROCEDURE — 87040 BLOOD CULTURE FOR BACTERIA: CPT | Performed by: PHYSICIAN ASSISTANT

## 2019-07-22 PROCEDURE — 36415 COLL VENOUS BLD VENIPUNCTURE: CPT | Performed by: PHYSICIAN ASSISTANT

## 2019-07-22 PROCEDURE — 86901 BLOOD TYPING SEROLOGIC RH(D): CPT | Performed by: PHYSICIAN ASSISTANT

## 2019-07-22 PROCEDURE — 85025 COMPLETE CBC W/AUTO DIFF WBC: CPT | Performed by: PHYSICIAN ASSISTANT

## 2019-07-22 PROCEDURE — 86850 RBC ANTIBODY SCREEN: CPT | Performed by: PHYSICIAN ASSISTANT

## 2019-07-22 PROCEDURE — 93005 ELECTROCARDIOGRAM TRACING: CPT

## 2019-07-22 PROCEDURE — 84145 PROCALCITONIN (PCT): CPT | Performed by: PHYSICIAN ASSISTANT

## 2019-07-22 PROCEDURE — 1123F ACP DISCUSS/DSCN MKR DOCD: CPT | Performed by: INTERNAL MEDICINE

## 2019-07-22 PROCEDURE — 87449 NOS EACH ORGANISM AG IA: CPT | Performed by: NURSE PRACTITIONER

## 2019-07-22 PROCEDURE — 84484 ASSAY OF TROPONIN QUANT: CPT | Performed by: PHYSICIAN ASSISTANT

## 2019-07-22 PROCEDURE — 80048 BASIC METABOLIC PNL TOTAL CA: CPT | Performed by: PHYSICIAN ASSISTANT

## 2019-07-22 PROCEDURE — 87086 URINE CULTURE/COLONY COUNT: CPT | Performed by: PHYSICIAN ASSISTANT

## 2019-07-22 PROCEDURE — 87081 CULTURE SCREEN ONLY: CPT

## 2019-07-22 PROCEDURE — 85730 THROMBOPLASTIN TIME PARTIAL: CPT | Performed by: PHYSICIAN ASSISTANT

## 2019-07-22 PROCEDURE — 71046 X-RAY EXAM CHEST 2 VIEWS: CPT

## 2019-07-22 PROCEDURE — C9113 INJ PANTOPRAZOLE SODIUM, VIA: HCPCS | Performed by: NURSE PRACTITIONER

## 2019-07-22 RX ORDER — ACETAMINOPHEN 325 MG/1
650 TABLET ORAL EVERY 4 HOURS PRN
COMMUNITY

## 2019-07-22 RX ORDER — HEPARIN SODIUM 5000 [USP'U]/ML
5000 INJECTION, SOLUTION INTRAVENOUS; SUBCUTANEOUS EVERY 8 HOURS SCHEDULED
Status: DISCONTINUED | OUTPATIENT
Start: 2019-07-22 | End: 2019-07-25 | Stop reason: HOSPADM

## 2019-07-22 RX ORDER — ACETAMINOPHEN 325 MG/1
650 TABLET ORAL EVERY 6 HOURS PRN
Status: DISCONTINUED | OUTPATIENT
Start: 2019-07-22 | End: 2019-07-25 | Stop reason: HOSPADM

## 2019-07-22 RX ORDER — SODIUM CHLORIDE 9 MG/ML
125 INJECTION, SOLUTION INTRAVENOUS CONTINUOUS
Status: DISCONTINUED | OUTPATIENT
Start: 2019-07-22 | End: 2019-07-22

## 2019-07-22 RX ORDER — DOCUSATE SODIUM 100 MG/1
200 CAPSULE, LIQUID FILLED ORAL DAILY
COMMUNITY

## 2019-07-22 RX ORDER — FAMOTIDINE 20 MG/1
20 TABLET, FILM COATED ORAL
COMMUNITY

## 2019-07-22 RX ORDER — SORBITOL SOLUTION 70 %
30 SOLUTION, ORAL MISCELLANEOUS DAILY PRN
COMMUNITY

## 2019-07-22 RX ORDER — FERROUS SULFATE 325(65) MG
325 TABLET ORAL
COMMUNITY

## 2019-07-22 RX ORDER — PANTOPRAZOLE SODIUM 40 MG/1
40 INJECTION, POWDER, FOR SOLUTION INTRAVENOUS DAILY
Status: DISCONTINUED | OUTPATIENT
Start: 2019-07-22 | End: 2019-07-25 | Stop reason: HOSPADM

## 2019-07-22 RX ADMIN — HEPARIN SODIUM 5000 UNITS: 5000 INJECTION INTRAVENOUS; SUBCUTANEOUS at 22:15

## 2019-07-22 RX ADMIN — BIMATOPROST 1 DROP: 0.1 SOLUTION/ DROPS OPHTHALMIC at 22:13

## 2019-07-22 RX ADMIN — VANCOMYCIN HYDROCHLORIDE 250 MG: 500 INJECTION, POWDER, LYOPHILIZED, FOR SOLUTION INTRAVENOUS at 22:39

## 2019-07-22 RX ADMIN — VANCOMYCIN HYDROCHLORIDE 500 MG: 500 INJECTION, POWDER, LYOPHILIZED, FOR SOLUTION INTRAVENOUS at 16:05

## 2019-07-22 RX ADMIN — CEFEPIME HYDROCHLORIDE 2000 MG: 2 INJECTION, SOLUTION INTRAVENOUS at 17:15

## 2019-07-22 RX ADMIN — METRONIDAZOLE 500 MG: 500 INJECTION, SOLUTION INTRAVENOUS at 17:47

## 2019-07-22 RX ADMIN — PANTOPRAZOLE SODIUM 40 MG: 40 INJECTION, POWDER, FOR SOLUTION INTRAVENOUS at 22:09

## 2019-07-22 NOTE — PROGRESS NOTES
[id number] - Outpatient Consultation  Kera Horta 80 y o  female MRN: [id number]  Encounter: [id number]    ASSESSMENT AND PLAN:      1  Dysphagia, unspecified type  2  Zenker's (hypopharyngeal) diverticulum  This may be related to her Zenker's diverticulum however she previously had dilation of her upper esophageal sphincter in 2014 which may need to be repeated  With low hemoglobin, 7 9, additional consideration for gastritis, esophagitis, PUD, less likely gastric AVMs or ectasias  Will need EGD in a hospital setting  Sending to [facility] given low hemoglobin, early satiety, weight loss, weakness  Spoke with nursing at [facility] to apprise them of her arrival   Did also alert Dr Gab Sanford that she is being sent to the ER for evaluation and treatment  3  Rectal bleeding  Unsure whether melena or hematochezia  Unable to reach nursing home staff to verify however they did report heme + stools on 07/18 and 7/19 as well as this morning  Staff reported rectal bleeding this morning to niece  Will send to [address] [place] [address] for evaluation and treatment  4  Colon cancer screening  Last colonoscopy 12/20/2006 showed hemorrhoids  No recall was advised due to advanced age  Followup Appointment[de-identified]  As indicated  ______________________________________________________________________    Chief Complaint   Patient presents with    Cough     "red matter" come up sometimes    Difficulty Swallowing    Rectal Bleeding       HPI:   Alexander Novak is a 80 y o   female who presents, accompanied by her niece, with complaint of dysphagia and increasing cough for the past 6 weeks  She alerted her family to the swallowing difficulty about 3 weeks ago and was seen by the physician at her local nursing home  Patient reports that she avoids meats as these have been a swallowing issue in the past   Currently having problems with any foods as well as with her pills  Her niece also reports that the patient has complained that food does not taste good for the past 2-3 months and she reports early satiety with some abdominal bloating  Weight has decreased about 15 lb since January  According to her family she seems to have little interest in eating  Denies odynophagia, nausea or vomiting, hematemesis or coffee-ground emesis  Does report anorexia and abdominal bloating  The nursing home staff also report heme +stools on 07/18 and 7/19  The niece reports that staff told her that there was blood in her stool this morning  We did phone the nursing home [facility]) but were unable to speak to staff  This provider did leave a voicemail detailing current information and requesting CB with more information on the rectal bleeding  The patient does report increased fatigue, weakness  Reports bowel movements are on the looser side but denies diarrhea or constipation  The patient is unsure whether her stools have melena or hematochezia  Historical Information   Past Medical History:   Diagnosis Date    Arthritis     [facility]     CKD (chronic kidney disease)     Glaucoma     Gout     Hypertension     Macular degeneration     Muscle weakness     [facility] [address] [facility]     Wound healing, delayed     pt states chronic wound on left shoulder    Zenker's (hypopharyngeal) diverticulum      Past Surgical History:   Procedure Laterality Date    COLONOSCOPY  12/20/2006    Hemorrhoids    ELBOW FRACTURE REPAIR Left     Pins and screws      ESOPHAGEAL DILATION      EYE SURGERY      cataract bilaterally    HERNIA REPAIR      left inguinal    INGUINAL HERNIA REPAIR Left     [place] ARTHROPLASTY,ELBOW,TOTAL PROSTH REPL Right 4/19/2016    Procedure: ARTHROPLASTY ELBOW, SPLINT APPLICATION;  Surgeon: Janee Hernandez MD;  [facility];  Service: Orthopedics    [place] OPEN [address] Left 5/7/2018    Procedure: OPEN REDUCTION W/ INTERNAL FIXATION (ORIF) OF LEFT OLECRANON;  Surgeon: David Hidalgo MD;  [facility];  Service: Orthopedics    TOTAL ELBOW REPLACEMENT Right     After fracture    UPPER GASTROINTESTINAL ENDOSCOPY  10/03/2014    Small Zenker's with dilation to 50 Portuguese bougie, gastritis, negative for H pylori     Social History     Substance and Sexual Activity   Alcohol Use No     Social History     Substance and Sexual Activity   Drug Use No     Social History     Tobacco Use   Smoking Status Never Smoker   Smokeless Tobacco Current User     Family History   Problem Relation Age of Onset    Heart disease Mother     Heart failure Mother     Heart disease Father     Arthritis Brother     Colon cancer Neg Hx     Colon polyps Neg Hx        Meds/Allergies     Current Outpatient Medications:     acetaminophen (TYLENOL) 325 mg tablet    amoxicillin (AMOXIL) 500 mg capsule    bimatoprost (LUMIGAN) 0 03 % ophthalmic drops    docusate sodium (COLACE) 100 mg capsule    dorzolamide-timolol (COSOPT) 2-0 5 % ophthalmic solution    famotidine (PEPCID) 20 mg tablet    febuxostat (ULORIC) 40 mg tablet    ferrous sulfate 325 (65 Fe) mg tablet    furosemide (LASIX) 40 mg tablet    Multiple Vitamins-Minerals (PRESERVISION AREDS 2 PO)    multivitamin (THERAGRAN) TABS    omeprazole (PriLOSEC) 40 MG capsule    potassium chloride (K-DUR,KLOR-CON) 20 mEq tablet    sorbitol 70 % solution    Vitamin D, Cholecalciferol, 1000 UNITS TABS    HYDROcodone-acetaminophen (NORCO) 5-325 mg per tablet    potassium chloride 10 %    Potassium Chloride ER 20 MEQ TBCR    Allergies   Allergen Reactions    Shellfish Allergy Other (See Comments)     Pt has gout    Vioxx [Rofecoxib] Other (See Comments)     Unable to urinate       PHYSICAL EXAM:    Height 4' 8" (1 422 m), weight 38 6 kg (85 lb)  Body mass index is 19 06 kg/m²    General Appearance: NAD, cooperative, alert  HEENT: Normocephalic, atraumatic, anicteric, pallor  Neck: Supple, symmetrical, trachea midline  Lungs:  Decreased to auscultation bilaterally; no rales, rhonchi or wheezing; respirations unlabored   Heart[de-identified]  Regular rate and rhythm; no murmur, rub, or gallop  Abdomen: Softly distended, non-tender,  normal bowel sounds; no masses, no organomegaly   Rectal:  Deferred   Extremities: No cyanosis, clubbing or edema   Skin: No jaundice, rashes, or lesions , she is pale  Lymph nodes: No palpable cervical lymphadenopathy  Psych: Normal affect, good eye contact  Neuro: No gross deficits, AAOx3    Lab Results:  Labs from 07/12 were reviewed in epic  Lab Results   Component Value Date    WBC 6 70 03/31/2017    HGB 10 3 (L) 03/31/2017    HCT 34 3 (L) 03/31/2017    MCV 89 03/31/2017     03/31/2017     Lab Results   Component Value Date     09/28/2015    K 4 4 05/01/2018     05/01/2018    CO2 23 05/01/2018    ANIONGAP 8 09/28/2015    BUN 33 (H) 05/01/2018    CREATININE 1 59 (H) 05/01/2018    GLUCOSE 103 09/28/2015    GLUF 81 03/31/2017    CALCIUM 9 8 05/01/2018    AST 23 05/01/2018    ALT 21 05/01/2018    ALKPHOS 88 05/01/2018    PROT 8 0 09/28/2015    BILITOT 0 29 09/28/2015    EGFR 28 05/01/2018     No results found for: IRON, TIBC, FERRITIN  No results found for: LIPASE    Radiology Results:   No results found  REVIEW OF SYSTEMS:    CONSTITUTIONAL: Denies any fever, chills, rigors, and weight loss  Reports fatigue  HEENT: No earache or tinnitus  Denies hearing loss or visual disturbances  CARDIOVASCULAR: No chest pain or palpitations  RESPIRATORY: Denies any cough, hemoptysis, shortness of breath or dyspnea on exertion  GASTROINTESTINAL: As noted in the History of Present Illness  Reports anorexia, abdominal bloating, blood in stools, dysphagia  GENITOURINARY: No problems with urination  Denies any hematuria or dysuria  NEUROLOGIC: No dizziness or vertigo, denies headaches  MUSCULOSKELETAL: Denies any muscle or joint pain     SKIN: Denies skin rashes or itching  ENDOCRINE: Denies excessive thirst  Denies intolerance to heat or cold  PSYCHOSOCIAL: Denies depression or anxiety  Denies any recent memory loss

## 2019-07-22 NOTE — H&P (VIEW-ONLY)
Consultation - HERNÁN Leong Berny Horta 80 y o  female MRN: [id number]  Unit/Bed#: ED 06 Encounter: [id number]    Consults  ASSESSMENT and PLAN    Patient is a delightful 80-year-old female with worsening dysphagia for solids greater than liquids  She does have a history of a Zenker's diverticulum she is anemic  She was noted to have pneumonia on x-ray in the emergency room  I suspect heard dysphagia and/or Zenker's are contributing factors in her pneumonia  I would recommend intensive treatment of the pneumonia at this point with consideration for barium swallow and or endoscopy later in the hospitalization  Sometimes it is possible to affected dilatation over wire for Zenker's and sometimes it is not  Surgical repair of the Zenker's would also be an option depending on the findings  We will continue to follow her closely        Chief Complaint   Patient presents with    Abnormal Lab     pt presents to ER after being increasingly weak, was seen at PCP and was told her hgb was 7 5  patient states having blood in stool and coughing up blood      Physician Requesting Consult: Kindra Cheney, DO    HPI Luis Daniel Gan is a 80y o  year old female who presents with worsening dysphagia for solids greater than liquids  Also feeling weak tired and run down  No abdominal pain no obvious rectal bleeding or melena although she was question to have heme-positive stool  She was seen in the office today by had other this morning I did speak with had other who sent her to the emergency room  In the ER she is comfortable but somewhat washed out and tired      Historical Information   Past Medical History:   Diagnosis Date    Arthritis     [facility]     CKD (chronic kidney disease)     Glaucoma     Gout     Hypertension     Macular degeneration     Muscle weakness     [facility]     Wound healing, delayed     pt states chronic wound on left shoulder    Zenker's (hypopharyngeal) diverticulum      Past Surgical History:   Procedure Laterality Date    COLONOSCOPY  12/20/2006    Hemorrhoids    ELBOW FRACTURE REPAIR Left     Pins and screws      ESOPHAGEAL DILATION      EYE SURGERY      cataract bilaterally    HERNIA REPAIR      left inguinal    INGUINAL HERNIA REPAIR Left     [place] ARTHROPLASTY,ELBOW,TOTAL PROSTH REPL Right 4/19/2016    Procedure: ARTHROPLASTY ELBOW, SPLINT APPLICATION;  Surgeon: Kathryn Bucio MD;  Location:  MAIN OR;  Service: Orthopedics    [place] OPEN [address] Left 5/7/2018    Procedure: OPEN REDUCTION W/ INTERNAL FIXATION (ORIF) OF LEFT OLECRANON;  Surgeon: Luis Miguel Liu MD;  Location:  MAIN OR;  Service: Orthopedics    TOTAL ELBOW REPLACEMENT Right     After fracture    UPPER GASTROINTESTINAL ENDOSCOPY  10/03/2014    Small Zenker's with dilation to 50 Greek bougie, gastritis, negative for H pylori     Social History   Social History     Substance and Sexual Activity   Alcohol Use No     Social History     Substance and Sexual Activity   Drug Use No     Social History     Tobacco Use   Smoking Status Never Smoker   Smokeless Tobacco Current User     Family History   Problem Relation Age of Onset    Heart disease Mother     Heart failure Mother     Heart disease Father     Arthritis Brother     Colon cancer Neg Hx     Colon polyps Neg Hx        Meds/Allergies   Current Facility-Administered Medications   Medication Dose Route Frequency    cefepime (MAXIPIME) 2 g/50 mL dextrose IVPB  2,000 mg Intravenous Once    metroNIDAZOLE (FLAGYL) IVPB (premix) 500 mg  500 mg Intravenous Once       (Not in a hospital admission)    Allergies   Allergen Reactions    Shellfish Allergy Other (See Comments)     Pt has gout    Vioxx [Rofecoxib] Other (See Comments)     Unable to urinate       10 Point Review of Systems - Negative except as outlined in the HPI    PHYSICALEXAM  /55   Pulse 64   Temp 98 2 °F (36 8 °C) (Temporal)   Resp 22   Ht 4' 8" (1 422 m)   Wt 38 6 kg (85 lb)   LMP  (LMP Unknown)   SpO2 94%   BMI 19 06 kg/m²   General appearance: alert, appears stated age and cooperative  Head: Normocephalic, without obvious abnormality, atraumatic  Eyes: sclerae anicteric, PERRLA   Lungs: clear to auscultation bilaterally, no wheeze   Heart: regular rate and rhythm,  no murmur  Abdomen: soft, mild epigastric and right upper quadrant tenderness; bowel sounds normal; no masses,  no organomegaly  Extremities: extremities normal, atraumatic, no cyanosis or edema  Neurologic: Grossly normal  Psych: alert, oriented x3  Skin: no rash, no jaundice    Lab Results   Component Value Date    GLUCOSE 103 09/28/2015    CALCIUM 9 3 07/22/2019     09/28/2015    K 4 3 07/22/2019    CO2 24 07/22/2019     07/22/2019    BUN 27 (H) 07/22/2019    CREATININE 1 48 (H) 07/22/2019     Lab Results   Component Value Date    WBC 13 24 (H) 07/22/2019    HGB 9 0 (L) 07/22/2019    HCT 29 1 (L) 07/22/2019    MCV 86 07/22/2019     (H) 07/22/2019     Lab Results   Component Value Date    ALT 16 07/22/2019    AST 34 07/22/2019    ALKPHOS 101 07/22/2019    BILITOT 0 29 09/28/2015     No results found for: AMYLASE  No results found for: LIPASE  No results found for: IRON, TIBC, FERRITIN  Lab Results   Component Value Date    INR 1 14 07/22/2019       Imaging Studies: I have personally reviewed pertinent reports  EKG, Pathology, and Other Studies: I have personally reviewed pertinent reports

## 2019-07-22 NOTE — H&P
H&P- Jose Alberto Fabian 3/13/1926, 80 y o  female MRN: [id number]    Unit/Bed#: ED 06 Encounter: [id number]    Primary Care Provider: Lamar Stearns DO   Date and time admitted to hospital: 7/22/2019  1:32 PM        * Dysphagia  Assessment & Plan  · Seen in GI office today  · History of esophageal dilation 2014  · NPO for potential EGD with dilation a m  · Speech eval    Aspiration pneumonia (HCC)  Assessment & Plan  · RUL PNA  · Cont cefepime/vanco/flagyl  · BC/UC/procal pending  · Speech eval    Rectal bleeding  Assessment & Plan  · Occult neg per ER RN  · Check add'l occult  · protonix IV while NPO    UTI (urinary tract infection)  Assessment & Plan  · Asymptomatic bacteria  · Urine culture pending  · Covered with antibiotics as listed above    CKD (chronic kidney disease) stage 4, GFR 15-29 ml/min ([facility])  Assessment & Plan  · At baseline creat 1 4-1 9      VTE Prophylaxis: Heparin  / sequential compression device   Code Status:  Full code  POLST: POLST form is not discussed and not completed at this time  Discussion with family: purnima    Anticipated Length of Stay:  Patient will be admitted on an Inpatient basis with an anticipated length of stay of  > 2 midnights  Justification for Hospital Stay: pna/dysphagia    Total Time for Visit, including Counseling / Coordination of Care: 45 minutes  Greater than 50% of this total time spent on direct patient counseling and coordination of care  Chief Complaint:   Dysphagia    History of Present Illness:    Jose Alberto Fabian is a 80 y o  female with past medical history of CKD stage 4, hypertension, Zenker's diverticulum, esophageal stricture status post dilation 2014 who presents with dysphagia x6 weeks from GI office  Patient is a poor historian and history is limited  She states she has had dysphagia for the last 6 weeks describes as food getting stuck and fatigue  She states that she has had a 15 lb weight loss since January of this year    Denies any fever/chills/shortness of breath/nausea vomiting  Labs revealed a WBC count of 13,000  Chest x-ray with right upper lobe pneumonia  Patient was administered cefepime/Flagyl/vancomycin  UA positive for moderate leukocytes/30-50 WBCs/bacteria moderate urine culture pending  Review of Systems:    Review of Systems   Constitutional: Positive for appetite change, fatigue and unexpected weight change  Negative for chills and fever  HENT: Negative  Eyes: Negative  Respiratory: Negative  Cardiovascular: Negative  Gastrointestinal: Negative  Endocrine: Negative  Genitourinary: Negative  Musculoskeletal: Negative  Allergic/Immunologic: Negative  Neurological: Negative  Hematological: Negative  Psychiatric/Behavioral: Negative  Past Medical and Surgical History:     Past Medical History:   Diagnosis Date    Arthritis     [facility]congestive [facility] failure) ([facility] [address] )     CKD (chronic kidney disease)     Glaucoma     Gout     Hypertension     Macular degeneration     Muscle weakness     RA [facility]heumatoid arthritis) ([facility])     Wound healing, delayed     pt states chronic wound on left shoulder    Zenker's (hypopharyngeal) diverticulum        Past Surgical History:   Procedure Laterality Date    COLONOSCOPY  12/20/2006    Hemorrhoids    ELBOW FRACTURE REPAIR Left     Pins and screws      ESOPHAGEAL DILATION      EYE SURGERY      cataract bilaterally    HERNIA REPAIR      left inguinal    INGUINAL HERNIA REPAIR Left     CA ARTHROPLASTY,ELBOW,TOTAL PROSTH REPL Right 4/19/2016    Procedure: ARTHROPLASTY ELBOW, SPLINT APPLICATION;  Surgeon: Millie Bernal MD;  Location:  MAIN OR;  Service: Orthopedics    CA OPEN 2016 [facility] [address] Left 5/7/2018    Procedure: OPEN REDUCTION W/ INTERNAL FIXATION (ORIF) OF LEFT OLECRANON;  Surgeon: Mariela Holley MD;  Location:  MAIN OR;  Service: Orthopedics    TOTAL ELBOW REPLACEMENT Right     After fracture    UPPER GASTROINTESTINAL ENDOSCOPY  10/03/2014    Small Zenker's with dilation to 50 French bougie, gastritis, negative for H pylori       Meds/Allergies:    Prior to Admission medications    Medication Sig Start Date End Date Taking? Authorizing Provider   acetaminophen (TYLENOL) 325 mg tablet Take 650 mg by mouth every 4 (four) hours as needed for mild pain    Historical Provider, MD   amoxicillin (AMOXIL) 500 mg capsule Take 500 mg by mouth every 8 (eight) hours    Historical Provider, MD   bimatoprost (LUMIGAN) 0 03 % ophthalmic drops Administer 1 drop into the left eye daily at bedtime  Historical Provider, MD   docusate sodium (COLACE) 100 mg capsule Take 200 mg by mouth daily    Historical Provider, MD   dorzolamide-timolol (COSOPT) 2-0 5 % ophthalmic solution Administer 1 drop into the left eye 2 (two) times a day  Historical Provider, MD   famotidine (PEPCID) 20 mg tablet Take 20 mg by mouth daily    Historical Provider, MD   febuxostat (ULORIC) 40 mg tablet Take 40 mg by mouth daily    Historical Provider, MD   ferrous sulfate 325 (65 Fe) mg tablet Take 325 mg by mouth daily with breakfast    Historical Provider, MD   furosemide (LASIX) 40 mg tablet Take 40 mg by mouth daily      Historical Provider, MD   HYDROcodone-acetaminophen (NORCO) 5-325 mg per tablet 0 5 to 1 tab PO q 6 hours prn pain  Patient not taking: Reported on 7/22/2019 5/7/18   Dennie Charnley, PA-C   Multiple Vitamins-Minerals (PRESERVISION AREDS 2 PO) Take 1 capsule by mouth daily    Historical Provider, MD   multivitamin (THERAGRAN) TABS Take 1 tablet by mouth daily      Historical Provider, MD   omeprazole (PriLOSEC) 40 MG capsule  5/18/18   Historical Provider, MD   potassium chloride (K-DUR,KLOR-CON) 20 mEq tablet  4/1/18   Historical Provider, MD   potassium chloride 10 %  5/12/18   Historical Provider, MD   Potassium Chloride ER 20 MEQ TBCR Take by mouth daily    Historical Provider, MD   sorbitol 70 % solution Take 30 mL by mouth daily as needed    Historical Provider, MD   Vitamin D, Cholecalciferol, 1000 UNITS TABS Take by mouth  Historical Provider, MD   omeprazole (PriLOSEC) 20 mg delayed release capsule Take 20 mg by mouth daily  7/22/19  Historical Provider, MD     I have reviewed home medications with a medical source (PCP, Pharmacy, other)  Allergies: Allergies   Allergen Reactions    Shellfish Allergy Other (See Comments)     Pt has gout    Vioxx [Rofecoxib] Other (See Comments)     Unable to urinate       Social History:     Marital Status:    Occupation:  Retired  Patient Pre-hospital Living Situation:  Nursing facility  Patient Pre-hospital Level of Mobility:  Uses walker  Patient Pre-hospital Diet Restrictions:  NA  Substance Use History:   Social History     Substance and Sexual Activity   Alcohol Use No     Social History     Tobacco Use   Smoking Status Never Smoker   Smokeless Tobacco Current User     Social History     Substance and Sexual Activity   Drug Use No       Family History:    Family History   Problem Relation Age of Onset    Heart disease Mother     Heart failure Mother     Heart disease Father     Arthritis Brother     Colon cancer Neg Hx     Colon polyps Neg Hx        Physical Exam:     Vitals:   Blood Pressure: 108/55 (07/22/19 1530)  Pulse: 64 (07/22/19 1530)  Temperature: 98 2 °F (36 8 °C) (07/22/19 1344)  Temp Source: Temporal (07/22/19 1344)  Respirations: 22 (07/22/19 1530)  Height: 4' 8" (142 2 cm) (07/22/19 1341)  Weight - Scale: 38 6 kg (85 lb) (07/22/19 1341)  SpO2: 94 % (07/22/19 1530)    Physical Exam   Constitutional: She is oriented to person, place, and time  Fail appearance   HENT:   Head: Normocephalic and atraumatic  Eyes: Pupils are equal, round, and reactive to light  Conjunctivae are normal  No scleral icterus  Neck: Neck supple  No tracheal deviation present  Cardiovascular: Normal rate, regular rhythm, normal heart sounds and intact distal pulses   Exam reveals no gallop and no friction rub  No murmur heard  Pulmonary/Chest: Effort normal  No respiratory distress  She has decreased breath sounds in the right upper field and the right middle field  She has no wheezes  She has no rales  Abdominal: Soft  Bowel sounds are normal  She exhibits no distension  There is no tenderness  Musculoskeletal: Normal range of motion  She exhibits no edema, tenderness or deformity  Neurological: She is alert and oriented to person, place, and time  No cranial nerve deficit  Skin: Skin is warm and dry  No rash noted  No erythema  No pallor  Psychiatric: She has a normal mood and affect  Her behavior is normal            Additional Data:     Lab Results: I have personally reviewed pertinent reports  Results from last 7 days   Lab Units 07/22/19  1407   WBC Thousand/uL 13 24*   HEMOGLOBIN g/dL 9 0*   HEMATOCRIT % 29 1*   PLATELETS Thousands/uL 408*   NEUTROS PCT % 73   LYMPHS PCT % 17   MONOS PCT % 8   EOS PCT % 1     Results from last 7 days   Lab Units 07/22/19  1436 07/22/19  1407   SODIUM mmol/L 138 135*   POTASSIUM mmol/L 4 3 5 4*   CHLORIDE mmol/L 106 106   CO2 mmol/L 24 22   BUN mg/dL 27* 27*   CREATININE mg/dL 1 48* 1 48*   ANION GAP mmol/L 8 7   CALCIUM mg/dL 9 3 9 6   ALBUMIN g/dL  --  2 0*   TOTAL BILIRUBIN mg/dL  --  0 30   ALK PHOS U/L  --  101   ALT U/L  --  16   AST U/L  --  34   GLUCOSE RANDOM mg/dL 99 99     Results from last 7 days   Lab Units 07/22/19  1407   INR  1 14                   Imaging: I have personally reviewed pertinent reports  and I have personally reviewed pertinent films in PACS    XR chest 2 views   Final Result by Jaky Santos MD (07/22 4223)      Findings concerning for right upper lobe pneumonia  A follow-up radiograph is recommended in 6-8 weeks to document resolution        Workstation performed: [id number]             EKG, Pathology, and Other Studies Reviewed on Admission:   · EKG:  Normal sinus rhythm    Allscripts / [place] Records Reviewed: Yes     ** Please Note: This note has been constructed using a voice recognition system   **

## 2019-07-22 NOTE — PROGRESS NOTES
Vancomycin Assessment    Zuri Jimenez is a 80 y o  female who is currently receiving vancomycin 500 mg q24h for Pneumonia     Relevant clinical data and objective history reviewed:  Creatinine   Date Value Ref Range Status   07/22/2019 1 48 (H) 0 60 - 1 30 mg/dL Final     Comment:     Standardized to IDMS reference method   07/22/2019 1 48 (H) 0 60 - 1 30 mg/dL Final     Comment:     Standardized to IDMS reference method   05/01/2018 1 59 (H) 0 60 - 1 30 mg/dL Final     Comment:     Standardized to IDMS reference method   09/28/2015 1 91 (H) 0 60 - 1 30 mg/dL Final     Comment:     Standardized to IDMS reference method   04/01/2015 1 89 (H) 0 60 - 1 30 mg/dL Final     Comment:     Standardized to IDMS reference method   09/29/2014 1 84 (H) 0 60 - 1 30 mg/dL Final     Comment:     Standardized to IDMS reference method     /61 (BP Location: Right arm)   Pulse 72   Temp 98 2 °F (36 8 °C) (Temporal)   Resp (!) 26   Ht 4' 8" (1 422 m)   Wt 38 6 kg (85 lb)   LMP  (LMP Unknown)   SpO2 94%   BMI 19 06 kg/m²   I/O last 3 completed shifts: In: 350 [IV Piggyback:350]  Out: -   Lab Results   Component Value Date/Time    BUN 27 (H) 07/22/2019 02:36 PM    BUN 58 (H) 09/28/2015 04:17 PM    WBC 13 24 (H) 07/22/2019 02:07 PM    WBC 5 60 09/28/2015 04:17 PM    HGB 9 0 (L) 07/22/2019 02:07 PM    HGB 13 1 09/28/2015 04:17 PM    HCT 29 1 (L) 07/22/2019 02:07 PM    HCT 39 6 09/28/2015 04:17 PM    MCV 86 07/22/2019 02:07 PM    MCV 95 09/28/2015 04:17 PM     (H) 07/22/2019 02:07 PM     09/28/2015 04:17 PM     Temp Readings from Last 3 Encounters:   07/22/19 98 2 °F (36 8 °C) (Temporal)   05/07/18 98 °F (36 7 °C)   02/18/17 99 2 °F (37 3 °C) (Tympanic)     Vancomycin Days of Therapy: Day #1    Assessment/Plan  The patient is currently on vancomycin utilizing scheduled dosing  Baseline risks associated with therapy include: pre-existing renal impairment and advanced age    The patient received 500 mg q24h  Patient received 500 mg x 1 dose with additional 250 mg to equal total loading dose of 750 mg  The most recent vancomycin level being not at steady-state and sub-therapeutic based on a goal of 15-20 (appropriate for most indications) ; therefore, after clinical evaluation will be changed to 750 mg q48h  With the next dose due 7-24-19 at 1600  Pharmacy will continue to follow closely for s/sx of nephrotoxicity, infusion reactions and appropriateness of therapy  BMP and CBC will be ordered per protocol  Plan for trough as patient approaches steady state, prior to the 3rd  dose at approximately 1530 on 7-26-19  Pharmacy will continue to follow the patients culture results and clinical progress daily      Moe Sanchez, Pharmacist

## 2019-07-22 NOTE — ED PROVIDER NOTES
History  Chief Complaint   Patient presents with    Abnormal Lab     pt presents to ER after being increasingly weak, was seen at PCP and was told her hgb was 7 5  patient states having blood in stool and coughing up blood      Patient is a 81 y/o F with h/o CHF, CKD, HTN, RA, Zenker's diverticulum that presents to the ED with weakness that started a few weeks ago  She has also had trouble swallowing solids and her pills for the past 6 weeks  Patient has a h/o this and her niece states she had her esophagus stretched  Patient has had a 15lb weight loss since January  She was seen at the GI doctor today and sent to the ED for further evaluation  Staff from [facility] states she has had blood in her stool for the past couple days  She had a UA which was negative  History provided by:  Patient and relative (niece)  Fatigue   Severity:  Moderate  Onset quality:  Gradual  Timing:  Constant  Progression:  Worsening  Chronicity:  New  Context: not recent infection, not stress and not urinary tract infection    Worsened by:  Nothing  Ineffective treatments:  None tried  Associated symptoms: anorexia    Associated symptoms: no abdominal pain, no chest pain, no cough, no diarrhea, no difficulty walking, no dizziness, no dysuria, no numbness in extremities, no fever, no foul-smelling urine, no frequency, no headaches, no melena, no nausea, no seizures, no shortness of breath and no vomiting    Risk factors: congestive heart failure    Risk factors: no new medications        Prior to Admission Medications   Prescriptions Last Dose Informant Patient Reported? Taking?    HYDROcodone-acetaminophen (NORCO) 5-325 mg per tablet  Care Giver No No   Si 5 to 1 tab PO q 6 hours prn pain   Patient not taking: Reported on 2019   Multiple Vitamins-Minerals (PRESERVISION AREDS 2 PO)  Care Giver Yes No   Sig: Take 1 capsule by mouth daily   Potassium Chloride ER 20 MEQ TBCR  Care Giver Yes No   Sig: Take by mouth daily Vitamin D, Cholecalciferol, 1000 UNITS TABS  Care Giver Yes No   Sig: Take by mouth  acetaminophen (TYLENOL) 325 mg tablet  Care Giver Yes No   Sig: Take 650 mg by mouth every 4 (four) hours as needed for mild pain   amoxicillin (AMOXIL) 500 mg capsule  Care Giver Yes No   Sig: Take 500 mg by mouth every 8 (eight) hours   bimatoprost (LUMIGAN) 0 03 % ophthalmic drops  Care Giver Yes No   Sig: Administer 1 drop into the left eye daily at bedtime  docusate sodium (COLACE) 100 mg capsule  Care Giver Yes No   Sig: Take 200 mg by mouth daily   dorzolamide-timolol (COSOPT) 2-0 5 % ophthalmic solution  Care Giver Yes No   Sig: Administer 1 drop into the left eye 2 (two) times a day  famotidine (PEPCID) 20 mg tablet  Care Giver Yes No   Sig: Take 20 mg by mouth daily   febuxostat (ULORIC) 40 mg tablet  Care Giver Yes No   Sig: Take 40 mg by mouth daily   ferrous sulfate 325 (65 Fe) mg tablet  Care Giver Yes No   Sig: Take 325 mg by mouth daily with breakfast   furosemide (LASIX) 40 mg tablet  Care Giver Yes No   Sig: Take 40 mg by mouth daily     multivitamin (THERAGRAN) TABS  Care Giver Yes No   Sig: Take 1 tablet by mouth daily     omeprazole (PriLOSEC) 40 MG capsule  Care Giver Yes No   potassium chloride (K-DUR,KLOR-CON) 20 mEq tablet  Care Giver Yes No   potassium chloride 10 %  Care Giver Yes No   sorbitol 70 % solution  Care Giver Yes No   Sig: Take 30 mL by mouth daily as needed      Facility-Administered Medications: None       Past Medical History:   Diagnosis Date    Arthritis     [facility]     CKD (chronic kidney disease)     Glaucoma     Gout     Hypertension     Macular degeneration     Muscle weakness     RA (rheumatoid arthritis) ([address] )     Wound healing, delayed     pt states chronic wound on left shoulder    Zenker's (hypopharyngeal) diverticulum        Past Surgical History:   Procedure Laterality Date    COLONOSCOPY  12/20/2006    Hemorrhoids    ELBOW FRACTURE REPAIR Left     Pins and screws   ESOPHAGEAL DILATION      EYE SURGERY      cataract bilaterally    HERNIA REPAIR      left inguinal    INGUINAL HERNIA REPAIR Left     LA ARTHROPLASTY,ELBOW,TOTAL PROSTH REPL Right 4/19/2016    Procedure: ARTHROPLASTY ELBOW, SPLINT APPLICATION;  Surgeon: Millie Bernal MD;  Location:  MAIN OR;  Service: Orthopedics    LA OPEN 2016 [facility] Left 5/7/2018    Procedure: OPEN REDUCTION W/ INTERNAL FIXATION (ORIF) OF LEFT OLECRANON;  Surgeon: Mariela Holley MD;  Location:  MAIN OR;  Service: Orthopedics    TOTAL ELBOW REPLACEMENT Right     After fracture    UPPER GASTROINTESTINAL ENDOSCOPY  10/03/2014    Small Zenker's with dilation to 50 Sw[place] bougie, gastritis, negative for H pylori       Family History   Problem Relation Age of Onset    Heart disease Mother     Heart failure Mother     Heart disease Father     Arthritis Brother     Colon cancer Neg Hx     Colon polyps Neg Hx      I have reviewed and agree with the history as documented  Social History     Tobacco Use    Smoking status: Never Smoker    Smokeless tobacco: Current User   Substance Use Topics    Alcohol use: No    Drug use: No        Review of Systems   Constitutional: Positive for fatigue  Negative for chills and fever  HENT: Negative  Respiratory: Negative for cough and shortness of breath  Cardiovascular: Negative for chest pain  Gastrointestinal: Positive for anorexia  Negative for abdominal pain, constipation, diarrhea, melena, nausea and vomiting  Genitourinary: Negative for dysuria and frequency  Musculoskeletal: Negative for back pain and neck pain  Skin: Positive for pallor  Negative for color change  Neurological: Positive for weakness  Negative for dizziness, seizures, speech difficulty and headaches  Psychiatric/Behavioral: Negative for confusion  All other systems reviewed and are negative        Physical Exam  Physical Exam Constitutional: She appears well-developed and well-nourished  She is cooperative  She does not appear ill  No distress  HENT:   Head: Normocephalic and atraumatic  Nose: Nose normal    Mouth/Throat: Mucous membranes are pale and not dry  Eyes: Conjunctivae are normal    Neck: Normal range of motion  Cardiovascular: Normal rate, regular rhythm and normal heart sounds  1+ pitting edema B/L LE  Pulmonary/Chest: Effort normal and breath sounds normal    Abdominal: Soft  Normal appearance and bowel sounds are normal  There is no tenderness  Genitourinary: Rectal exam shows external hemorrhoid  Rectal exam shows no mass, anal tone normal and guaiac negative stool  Neurological: She is alert  She is not disoriented  Skin: Skin is warm and dry  No rash noted  She is not diaphoretic  There is pallor  Nursing note and vitals reviewed        Vital Signs  ED Triage Vitals   Temperature Pulse Respirations Blood Pressure SpO2   07/22/19 1344 07/22/19 1341 07/22/19 1341 07/22/19 1343 07/22/19 1341   98 2 °F (36 8 °C) 75 19 153/68 94 %      Temp Source Heart Rate Source Patient Position - Orthostatic VS BP Location FiO2 (%)   07/22/19 1344 07/22/19 1341 07/22/19 1341 07/22/19 1341 --   Temporal Monitor Lying Right arm       Pain Score       07/22/19 1341       No Pain           Vitals:    07/22/19 1341 07/22/19 1343   BP:  153/68   Pulse: 75    Patient Position - Orthostatic VS: Lying          Visual Acuity      ED Medications  Medications   cefepime (MAXIPIME) 2 g/50 mL dextrose IVPB (has no administration in time range)   vancomycin (VANCOCIN) 500 mg in sodium chloride 0 9 % 100 mL IVPB (has no administration in time range)   metroNIDAZOLE (FLAGYL) IVPB (premix) 500 mg (has no administration in time range)       Diagnostic Studies  Results Reviewed     Procedure Component Value Units Date/Time    Blood culture #1 [[id number]]     Lab Status:  No result Specimen:  Blood     Blood culture #2 [[id number]] Lab Status:  No result Specimen:  Blood     Procalcitonin [[id number]]     Lab Status:  No result Specimen:  Blood     Basic metabolic panel [[id number]]  (Abnormal) Collected:  07/22/19 1436    Lab Status:  Final result Specimen:  Blood from Arm, Left Updated:  07/22/19 1505     Sodium 138 mmol/L      Potassium 4 3 mmol/L      Chloride 106 mmol/L      CO2 24 mmol/L      ANION GAP 8 mmol/L      BUN 27 mg/dL      Creatinine 1 48 mg/dL      Glucose 99 mg/dL      Calcium 9 3 mg/dL      eGFR 30 ml/min/1 73sq m     Narrative:       National Kidney Disease Foundation guidelines for Chronic Kidney Disease (CKD):     Stage 1 with normal or high GFR (GFR > 90 mL/min/1 73 square meters)    Stage 2 Mild CKD (GFR = 60-89 mL/min/1 73 square meters)    Stage 3A Moderate CKD (GFR = 45-59 mL/min/1 73 square meters)    Stage 3B Moderate CKD (GFR = 30-44 mL/min/1 73 square meters)    Stage 4 Severe CKD (GFR = 15-29 mL/min/1 73 square meters)    Stage 5 End Stage CKD (GFR <15 mL/min/1 73 square meters)  Note: GFR calculation is accurate only with a steady state creatinine    Troponin I [[id number]]  (Normal) Collected:  07/22/19 1436    Lab Status:  Final result Specimen:  Blood from Arm, Left Updated:  07/22/19 1503     Troponin I 0 03 ng/mL     Comprehensive metabolic panel [[id number]]  (Abnormal) Collected:  07/22/19 1407    Lab Status:  Final result Specimen:  Blood from Arm, Left Updated:  07/22/19 1438     Sodium 135 mmol/L      Potassium 5 4 mmol/L      Chloride 106 mmol/L      CO2 22 mmol/L      ANION GAP 7 mmol/L      BUN 27 mg/dL      Creatinine 1 48 mg/dL      Glucose 99 mg/dL      Calcium 9 6 mg/dL      AST 34 U/L      ALT 16 U/L      Alkaline Phosphatase 101 U/L      Total Protein 7 1 g/dL      Albumin 2 0 g/dL      Total Bilirubin 0 30 mg/dL      eGFR 30 ml/min/1 73sq m     Narrative:       [place] guidelines for Chronic Kidney Disease (CKD):     Stage 1 with normal or high GFR (GFR > 90 mL/min/1 73 square meters)    Stage 2 Mild CKD (GFR = 60-89 mL/min/1 73 square meters)    Stage 3A Moderate CKD (GFR = 45-59 mL/min/1 73 square meters)    Stage 3B Moderate CKD (GFR = 30-44 mL/min/1 73 square meters)    Stage 4 Severe CKD (GFR = 15-29 mL/min/1 73 square meters)    Stage 5 End Stage CKD (GFR <15 mL/min/1 73 square meters)  Note: GFR calculation is accurate only with a steady state creatinine    Protime-INR [[id number]]  (Normal) Collected:  07/22/19 1407    Lab Status:  Final result Specimen:  Blood from Arm, Left Updated:  07/22/19 1435     Protime 14 3 seconds      INR 1 14    APTT [[id number]]  (Normal) Collected:  07/22/19 1407    Lab Status:  Final result Specimen:  Blood from Arm, Left Updated:  07/22/19 1435     PTT 29 seconds     CBC and differential [[id number]]  (Abnormal) Collected:  07/22/19 1407    Lab Status:  Final result Specimen:  Blood from Arm, Left Updated:  07/22/19 1419     WBC 13 24 Thousand/uL      RBC 3 39 Million/uL      Hemoglobin 9 0 g/dL      Hematocrit 29 1 %      MCV 86 fL      MCH 26 5 pg      MCHC 30 9 g/dL      RDW 18 2 %      MPV 10 4 fL      Platelets 483 Thousands/uL      nRBC 0 /100 WBCs      Neutrophils Relative 73 %      Immat GRANS % 1 %      Lymphocytes Relative 17 %      Monocytes Relative 8 %      Eosinophils Relative 1 %      Basophils Relative 0 %      Neutrophils Absolute 9 80 Thousands/µL      Immature Grans Absolute 0 08 Thousand/uL      Lymphocytes Absolute 2 21 Thousands/µL      Monocytes Absolute 0 99 Thousand/µL      Eosinophils Absolute 0 14 Thousand/µL      Basophils Absolute 0 02 Thousands/µL     UA w Reflex to Microscopic w Reflex to Culture [[id number]]     Lab Status:  No result Specimen:  Urine, Clean Catch                  XR chest 2 views   Final Result by Madison Schwab MD (07/22 8423)      Findings concerning for right upper lobe pneumonia  A follow-up radiograph is recommended in 6-8 weeks to document resolution        Workstation performed: [id number]                    Procedures  ECG 12 Lead Documentation Only  Date/Time: 7/22/2019 2:58 PM  Performed by: George Blackburn PA-C  Authorized by: George Blackburn PA-C     Indications / Diagnosis:  Weakness  ECG reviewed by me, the ED Provider: yes    Patient location:  ED  Previous ECG:     Previous ECG:  Compared to current    Comparison ECG info:  T wave inversion now present   Similarity:  Changes noted  Rate:     ECG rate:  69  Rhythm:     Rhythm: sinus rhythm    Conduction:     Conduction: abnormal      Abnormal conduction: 1st degree    ST segments:     ST segments:  Normal  T waves:     T waves: inverted    Q waves:     Q waves:  V4, V5 and V6           ED Course                               MDM  Number of Diagnoses or Management Options  Anemia: established and improving  Difficulty swallowing solids: established and worsening  Healthcare-associated pneumonia: new and requires workup  Diagnosis management comments: Patient with generalized weakness, h/o anemia, will check labs to r/o anemia, electrolyte abnormality or cardiac disease  Patient found to have pneumonia on CXR, concern for aspiration since patient has been choking on solid food, will treat with IV antibiotics           Amount and/or Complexity of Data Reviewed  Clinical lab tests: ordered and reviewed  Tests in the radiology section of CPT®: ordered and reviewed  Discuss the patient with other providers: yes (Dr Nikita Kc  )    Patient Progress  Patient progress: stable      Disposition  Final diagnoses:   Anemia   Healthcare-associated pneumonia   Difficulty swallowing solids     Time reflects when diagnosis was documented in both MDM as applicable and the Disposition within this note     Time User Action Codes Description Comment    7/22/2019  3:26 PM Arash Majano [D64 9] Anemia     7/22/2019  3:26 PM Devan Zuluaga [J18 9] Healthcare-associated pneumonia     7/22/2019  3:27 PM Muriel Jimenez [address] Shasha Prabhakar [R13 10] Difficulty swallowing solids       ED Disposition     ED Disposition Condition Date/Time Comment    Admit Stable Mon Jul 22, 2019  3:26 PM Case was discussed with Dr Mahendra Tavares and the patient's admission status was agreed to be Admission Status: inpatient status to the service of Dr Mahendra Tavares   Follow-up Information    None         Patient's Medications   Discharge Prescriptions    No medications on file     No discharge procedures on file      ED Provider  Electronically Signed by           Sanjuanita Smith PA-C  07/22/19 7553

## 2019-07-22 NOTE — ASSESSMENT & PLAN NOTE
Chief complaint:   Chief Complaint   Patient presents with   â¢ Sore Throat     Vitals:  Visit Vitals  /78 (BP Location: [facility] [place], Patient Position: Sitting, Cuff Size: Large Adult)   Pulse 95   Temp 97.5 Â°F (36.4 Â°C)   Resp 18   Ht 6' (1.829 m)   Wt 102.5 kg   SpO2 97%   BMI 30.65 kg/mÂ²       HISTORY OF PRESENT ILLNESS     HPI  Jean Claude Mccann is a 28year old male who comes in today complaining of body aches, night sweats, rhinorrhea, nausea, frontal sinus pressure, sore throat, and white spots in the back of tonsils for the last week. Patient was seen in the urgent care on 3/20/19 for sore throat and white spots. At that visit rapid strep was negative. Sore throat has worsened over the last couple of days. States that whenever he coughs there is little white porfirio or debris that comes out. For treatment he tried ibuprofen and Tylenol with no relief. States that 2 days before he got sick he had 6 teeth removed. Prior to getting his teeth removed he was placed on a course of antibiotics. Requesting another strep testing. I have reviewed the patient's medications, allergies, medical history, surgical history, and family history. See EMR for a display of this information. 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         Allergies:  ALLERGIES:   Allergen Reactions   â¢ Penicillins RASH       Past Medical  History/Surgeries:  Past Medical History:   Diagnosis Date   â¢ ADHD (attention · Occult neg per ER RN  · Check add'l occult  · protonix IV while NPO deficit hyperactivity disorder)     prior to high school. Past Surgical History:   Procedure Laterality Date   â¢ Oral surgery procedure  01/01/1995    tooth extraction       Family History:  Family History   Problem Relation Age of Onset   â¢ COPD Father        Social History:  Social History     Tobacco Use   â¢ Smoking status: Current Every Day Smoker     Packs/day: 0.50     Years: 10.00     Pack years: 5.00     Types: Cigarettes   â¢ Smokeless tobacco: Never Used   Substance Use Topics   â¢ Alcohol use: No     Frequency: Never       REVIEW OF SYSTEMS     Review of Systems   Constitutional: Positive for chills. Negative for fatigue and fever. HENT: Positive for postnasal drip, rhinorrhea, sinus pressure and sore throat. Negative for congestion and ear pain. Respiratory: Negative for cough, shortness of breath and wheezing. Cardiovascular: Negative for chest pain and palpitations. Gastrointestinal: Negative for diarrhea, nausea and vomiting. Skin: Negative for rash. PHYSICAL EXAM     Physical Exam  General:  Alert, cooperative, conversive. No acute distress. Eyes:  Normal conjunctivae and sclerae. Pupils equal, round, reactive to light. Ears: External ears normal. Canals clear. Tympanic membranes are clear without erythema, retraction, or bugling. Right TM with small amount of clear fluids. Nose: Nares normal. Mucosa normal. No drainage. Mouth/Throat: Mucous membranes are moist. Moderate oropharyngeal erythema. No exudate. Tonsils +2. Craters and tonsil stones noted. Sinuses: Positive for tenderness in frontal sinuses. Lymphatic:  No lymphadenopathy. Cardiovascular: Regular rate and rhythm without murmur. Respiratory:   Normal respiratory effort. Clear to auscultation. No wheezes, rales or rhonchi. ASSESSMENT/PLAN     1.  Sore throat  - STREP GROUP A SCREEN RAPID WITH REFLEX TO CULTURE    Results for orders placed or performed in visit on 03/25/19   STREP GROUP A SCREEN RAPID WITH REFLEX TO CULTURE   Result Value    RAPID STREP GROUP A negative     - predniSONE (DELTASONE) 20 MG tablet; Take 2 tablets by mouth daily. Dispense: 10 tablet; Refill: 0  - Ibuprofen for pain. 2. Tonsil stone  - Salt water gargles. Can try to dislodge with cotton swab. 3. Acute non-recurrent frontal sinusitis  - doxycycline monohydrate (MONODOX) 100 MG capsule; Take 1 capsule by mouth 2 times daily for 10 days. Dispense: 20 capsule; Refill: 0    - Follow up with PCP or return to  for worsening symptoms. Seek urgent medical care for chest pain, shortness of breath, persistent fevers, and chills. - Patient verbalized understanding and agrees with the care of plan. Supervising physician: Dr. Rogerio Tomlinson.     Queenie Song, CNP

## 2019-07-22 NOTE — CONSULTS
Consultation - HERNÁN Nath Elielkaitlin Rula 80 y o  female MRN: [id number]  Unit/Bed#: ED 06 Encounter: [id number]    Consults  ASSESSMENT and PLAN    Patient is a delightful 80-year-old female with worsening dysphagia for solids greater than liquids  She does have a history of a Zenker's diverticulum she is anemic  She was noted to have pneumonia on x-ray in the emergency room  I suspect heard dysphagia and/or Zenker's are contributing factors in her pneumonia  I would recommend intensive treatment of the pneumonia at this point with consideration for barium swallow and or endoscopy later in the hospitalization  Sometimes it is possible to affected dilatation over wire for Zenker's and sometimes it is not  Surgical repair of the Zenker's would also be an option depending on the findings  We will continue to follow her closely        Chief Complaint   Patient presents with    Abnormal Lab     pt presents to ER after being increasingly weak, was seen at PCP and was told her hgb was 7 5  patient states having blood in stool and coughing up blood      Physician Requesting Consult: DO DHARA Schneider Michelle Marti is a 80y o  year old female who presents with worsening dysphagia for solids greater than liquids  Also feeling weak tired and run down  No abdominal pain no obvious rectal bleeding or melena although she was question to have heme-positive stool  She was seen in the office today by had other this morning I did speak with had other who sent her to the emergency room  In the ER she is comfortable but somewhat washed out and tired      Historical Information   Past Medical History:   Diagnosis Date    Arthritis     CHF (congestive heart failure) (HCC)     CKD (chronic kidney disease)     Glaucoma     Gout     Hypertension     Macular degeneration     Muscle weakness     RA [facility]rheumatoid arthritis) ([facility] [address] )     Wound healing, delayed     pt states chronic wound on left shoulder    Zenker's (hypopharyngeal) diverticulum      Past Surgical History:   Procedure Laterality Date    COLONOSCOPY  12/20/2006    Hemorrhoids    ELBOW FRACTURE REPAIR Left     Pins and screws      ESOPHAGEAL DILATION      EYE SURGERY      cataract bilaterally    HERNIA REPAIR      left inguinal    INGUINAL HERNIA REPAIR Left     OH ARTHROPLASTY,ELBOW,TOTAL PROSTH REPL Right 4/19/2016    Procedure: ARTHROPLASTY ELBOW, SPLINT APPLICATION;  Surgeon: Arnulfo Ayoub MD;  Location:  MAIN OR;  Service: Orthopedics    OH OPEN [address] Left 5/7/2018    Procedure: OPEN REDUCTION W/ INTERNAL FIXATION (ORIF) OF LEFT OLECRANON;  Surgeon: Jaylon Reynoso MD;  Location:  MAIN OR;  Service: Orthopedics    TOTAL ELBOW REPLACEMENT Right     After fracture    UPPER GASTROINTESTINAL ENDOSCOPY  10/03/2014    Small Zenker's with dilation to 50 [place] bougie, gastritis, negative for H pylori     Social History   Social History     Substance and Sexual Activity   Alcohol Use No     Social History     Substance and Sexual Activity   Drug Use No     Social History     Tobacco Use   Smoking Status Never Smoker   Smokeless Tobacco Current User     Family History   Problem Relation Age of Onset    Heart disease Mother     Heart failure Mother     Heart disease Father     Arthritis Brother     Colon cancer Neg Hx     Colon polyps Neg Hx        Meds/Allergies   Current Facility-Administered Medications   Medication Dose Route Frequency    cefepime (MAXIPIME) 2 g/50 mL dextrose IVPB  2,000 mg Intravenous Once    metroNIDAZOLE (FLAGYL) IVPB (premix) 500 mg  500 mg Intravenous Once       (Not in a hospital admission)    Allergies   Allergen Reactions    Shellfish Allergy Other (See Comments)     Pt has gout    Vioxx [Rofecoxib] Other (See Comments)     Unable to urinate       10 Point Review of Systems - Negative except as outlined in the HPI    PHYSICALEXAM  /55   Pulse 64   Temp 98 2 °F (36 8 °C) (Temporal)   Resp 22   Ht 4' 8" (1 422 m)   Wt 38 6 kg (85 lb)   LMP  (LMP Unknown)   SpO2 94%   BMI 19 06 kg/m²   General appearance: alert, appears stated age and cooperative  Head: Normocephalic, without obvious abnormality, atraumatic  Eyes: sclerae anicteric, PERRLA   Lungs: clear to auscultation bilaterally, no wheeze   Heart: regular rate and rhythm,  no murmur  Abdomen: soft, mild epigastric and right upper quadrant tenderness; bowel sounds normal; no masses,  no organomegaly  Extremities: extremities normal, atraumatic, no cyanosis or edema  Neurologic: Grossly normal  Psych: alert, oriented x3  Skin: no rash, no jaundice    Lab Results   Component Value Date    GLUCOSE 103 09/28/2015    CALCIUM 9 3 07/22/2019     09/28/2015    K 4 3 07/22/2019    CO2 24 07/22/2019     07/22/2019    BUN 27 (H) 07/22/2019    CREATININE 1 48 (H) 07/22/2019     Lab Results   Component Value Date    WBC 13 24 (H) 07/22/2019    HGB 9 0 (L) 07/22/2019    HCT 29 1 (L) 07/22/2019    MCV 86 07/22/2019     (H) 07/22/2019     Lab Results   Component Value Date    ALT 16 07/22/2019    AST 34 07/22/2019    ALKPHOS 101 07/22/2019    BILITOT 0 29 09/28/2015     No results found for: AMYLASE  No results found for: LIPASE  No results found for: IRON, TIBC, FERRITIN  Lab Results   Component Value Date    INR 1 14 07/22/2019       Imaging Studies: I have personally reviewed pertinent reports  EKG, Pathology, and Other Studies: I have personally reviewed pertinent reports

## 2019-07-22 NOTE — ASSESSMENT & PLAN NOTE
· Seen in GI office today  · History of esophageal dilation 2014  · NPO for potential EGD with dilation a brittney    · Jazzmien patel

## 2019-07-23 ENCOUNTER — APPOINTMENT (INPATIENT)
Dept: RADIOLOGY | Facility: HOSPITAL | Age: 84
DRG: 811 | End: 2019-07-23
Payer: MEDICARE

## 2019-07-23 LAB
ANION GAP SERPL CALCULATED.3IONS-SCNC: 7 MMOL/L (ref 4–13)
BACTERIA UR CULT: NORMAL
BASOPHILS # BLD AUTO: 0.01 THOUSANDS/ΜL (ref 0–0.1)
BASOPHILS NFR BLD AUTO: 0 % (ref 0–1)
BUN SERPL-MCNC: 27 MG/DL (ref 5–25)
CALCIUM SERPL-MCNC: 8.8 MG/DL (ref 8.3–10.1)
CHLORIDE SERPL-SCNC: 110 MMOL/L (ref 100–108)
CO2 SERPL-SCNC: 22 MMOL/L (ref 21–32)
CREAT SERPL-MCNC: 1.52 MG/DL (ref 0.6–1.3)
EOSINOPHIL # BLD AUTO: 0.14 THOUSAND/ΜL (ref 0–0.61)
EOSINOPHIL NFR BLD AUTO: 1 % (ref 0–6)
ERYTHROCYTE [DISTWIDTH] IN BLOOD BY AUTOMATED COUNT: 18.1 % (ref 11.6–15.1)
FERRITIN SERPL-MCNC: 82 NG/ML (ref 8–388)
FOLATE SERPL-MCNC: >20 NG/ML (ref 3.1–17.5)
GFR SERPL CREATININE-BSD FRML MDRD: 29 ML/MIN/1.73SQ M
GLUCOSE SERPL-MCNC: 102 MG/DL (ref 65–140)
GLUCOSE SERPL-MCNC: 117 MG/DL (ref 65–140)
HCT VFR BLD AUTO: 26.5 % (ref 34.8–46.1)
HGB BLD-MCNC: 8 G/DL (ref 11.5–15.4)
IMM GRANULOCYTES # BLD AUTO: 0.06 THOUSAND/UL (ref 0–0.2)
IMM GRANULOCYTES NFR BLD AUTO: 1 % (ref 0–2)
IRON SATN MFR SERPL: 11 %
IRON SERPL-MCNC: 18 UG/DL (ref 50–170)
LYMPHOCYTES # BLD AUTO: 1.54 THOUSANDS/ΜL (ref 0.6–4.47)
LYMPHOCYTES NFR BLD AUTO: 15 % (ref 14–44)
MCH RBC QN AUTO: 25.8 PG (ref 26.8–34.3)
MCHC RBC AUTO-ENTMCNC: 30.2 G/DL (ref 31.4–37.4)
MCV RBC AUTO: 86 FL (ref 82–98)
MONOCYTES # BLD AUTO: 0.84 THOUSAND/ΜL (ref 0.17–1.22)
MONOCYTES NFR BLD AUTO: 8 % (ref 4–12)
NEUTROPHILS # BLD AUTO: 7.58 THOUSANDS/ΜL (ref 1.85–7.62)
NEUTS SEG NFR BLD AUTO: 75 % (ref 43–75)
NRBC BLD AUTO-RTO: 0 /100 WBCS
PLATELET # BLD AUTO: 351 THOUSANDS/UL (ref 149–390)
PMV BLD AUTO: 10.3 FL (ref 8.9–12.7)
POTASSIUM SERPL-SCNC: 3.9 MMOL/L (ref 3.5–5.3)
RBC # BLD AUTO: 3.1 MILLION/UL (ref 3.81–5.12)
SODIUM SERPL-SCNC: 139 MMOL/L (ref 136–145)
TIBC SERPL-MCNC: 157 UG/DL (ref 250–450)
VIT B12 SERPL-MCNC: 1477 PG/ML (ref 100–900)
WBC # BLD AUTO: 10.17 THOUSAND/UL (ref 4.31–10.16)

## 2019-07-23 PROCEDURE — 92610 EVALUATE SWALLOWING FUNCTION: CPT

## 2019-07-23 PROCEDURE — 97163 PT EVAL HIGH COMPLEX 45 MIN: CPT

## 2019-07-23 PROCEDURE — 82607 VITAMIN B-12: CPT | Performed by: HOSPITALIST

## 2019-07-23 PROCEDURE — 83540 ASSAY OF IRON: CPT | Performed by: HOSPITALIST

## 2019-07-23 PROCEDURE — 82948 REAGENT STRIP/BLOOD GLUCOSE: CPT

## 2019-07-23 PROCEDURE — 74220 X-RAY XM ESOPHAGUS 1CNTRST: CPT

## 2019-07-23 PROCEDURE — C9113 INJ PANTOPRAZOLE SODIUM, VIA: HCPCS | Performed by: NURSE PRACTITIONER

## 2019-07-23 PROCEDURE — 83550 IRON BINDING TEST: CPT | Performed by: HOSPITALIST

## 2019-07-23 PROCEDURE — 82728 ASSAY OF FERRITIN: CPT | Performed by: HOSPITALIST

## 2019-07-23 PROCEDURE — G8997 SWALLOW GOAL STATUS: HCPCS

## 2019-07-23 PROCEDURE — 99232 SBSQ HOSP IP/OBS MODERATE 35: CPT | Performed by: INTERNAL MEDICINE

## 2019-07-23 PROCEDURE — 80048 BASIC METABOLIC PNL TOTAL CA: CPT | Performed by: NURSE PRACTITIONER

## 2019-07-23 PROCEDURE — 82746 ASSAY OF FOLIC ACID SERUM: CPT | Performed by: HOSPITALIST

## 2019-07-23 PROCEDURE — 99233 SBSQ HOSP IP/OBS HIGH 50: CPT | Performed by: HOSPITALIST

## 2019-07-23 PROCEDURE — G8996 SWALLOW CURRENT STATUS: HCPCS

## 2019-07-23 PROCEDURE — 85025 COMPLETE CBC W/AUTO DIFF WBC: CPT | Performed by: NURSE PRACTITIONER

## 2019-07-23 RX ORDER — AZITHROMYCIN 250 MG/1
500 TABLET, FILM COATED ORAL EVERY 24 HOURS
Status: DISCONTINUED | OUTPATIENT
Start: 2019-07-23 | End: 2019-07-25

## 2019-07-23 RX ORDER — SODIUM CHLORIDE 9 MG/ML
75 INJECTION, SOLUTION INTRAVENOUS CONTINUOUS
Status: DISCONTINUED | OUTPATIENT
Start: 2019-07-23 | End: 2019-07-24

## 2019-07-23 RX ORDER — CEFTRIAXONE 1 G/50ML
1000 INJECTION, SOLUTION INTRAVENOUS EVERY 24 HOURS
Status: DISCONTINUED | OUTPATIENT
Start: 2019-07-23 | End: 2019-07-25

## 2019-07-23 RX ADMIN — BIMATOPROST 1 DROP: 0.1 SOLUTION/ DROPS OPHTHALMIC at 22:05

## 2019-07-23 RX ADMIN — PANTOPRAZOLE SODIUM 40 MG: 40 INJECTION, POWDER, FOR SOLUTION INTRAVENOUS at 09:00

## 2019-07-23 RX ADMIN — METRONIDAZOLE 500 MG: 500 INJECTION, SOLUTION INTRAVENOUS at 09:01

## 2019-07-23 RX ADMIN — HEPARIN SODIUM 5000 UNITS: 5000 INJECTION INTRAVENOUS; SUBCUTANEOUS at 22:05

## 2019-07-23 RX ADMIN — HEPARIN SODIUM 5000 UNITS: 5000 INJECTION INTRAVENOUS; SUBCUTANEOUS at 05:14

## 2019-07-23 RX ADMIN — METRONIDAZOLE 500 MG: 500 INJECTION, SOLUTION INTRAVENOUS at 02:35

## 2019-07-23 RX ADMIN — HEPARIN SODIUM 5000 UNITS: 5000 INJECTION INTRAVENOUS; SUBCUTANEOUS at 14:22

## 2019-07-23 RX ADMIN — CEFTRIAXONE 1000 MG: 1 INJECTION, SOLUTION INTRAVENOUS at 16:43

## 2019-07-23 RX ADMIN — AZITHROMYCIN 500 MG: 250 TABLET, FILM COATED ORAL at 16:43

## 2019-07-23 RX ADMIN — SODIUM CHLORIDE 75 ML/HR: 0.9 INJECTION, SOLUTION INTRAVENOUS at 11:24

## 2019-07-23 NOTE — ASSESSMENT & PLAN NOTE
-Seen in GI office on day of admission  -History of esophageal dilation 2014  -NPO for potential EGD with dilation, start IVFs  -Speech eval once pt taking PO again  -I personally discussed the case with Dr Dasilva Session over the phone

## 2019-07-23 NOTE — PLAN OF CARE
Problem: Potential for Falls  Goal: Patient will remain free of falls  Description  INTERVENTIONS:  - Assess patient frequently for physical needs  -  Identify cognitive and physical deficits and behaviors that affect risk of falls  -  [place] fall precautions as indicated by assessment   - Educate patient/family on patient safety including physical limitations  - Instruct patient to call for assistance with activity based on assessment  - Modify environment to reduce risk of injury  - Consider OT/PT consult to assist with strengthening/mobility  Outcome: Progressing     Problem: Nutrition/Hydration-ADULT  Goal: Nutrient/Hydration intake appropriate for improving, restoring or maintaining nutritional needs  Description  Monitor and assess patient's nutrition/hydration status for malnutrition (ex- brittle hair, bruises, dry skin, pale skin and conjunctiva, muscle wasting, smooth red tongue, and disorientation)  Collaborate with interdisciplinary team and initiate plan and interventions as ordered  Monitor patient's weight and dietary intake as ordered or per policy  Utilize nutrition screening tool and intervene per policy  Determine patient's food preferences and provide high-protein, high-caloric foods as appropriate       INTERVENTIONS:  - Monitor oral intake, urinary output, labs, and treatment plans  - Assess nutrition and hydration status and recommend course of action  - Evaluate amount of meals eaten  - Assist patient with eating if necessary   - Allow adequate time for meals  - Recommend/ encourage appropriate diets, oral nutritional supplements, and vitamin/mineral supplements  - Order, calculate, and assess calorie counts as needed  - Recommend, monitor, and adjust tube feedings and TPN/PPN based on assessed needs  - Assess need for intravenous fluids  - Provide specific nutrition/hydration education as appropriate  - Include patient/family/caregiver in decisions related to nutrition  Outcome: Progressing     Problem: Prexisting or High Potential for Compromised Skin Integrity  Goal: Skin integrity is maintained or improved  Description  INTERVENTIONS:  - Identify patients at risk for skin breakdown  - Assess and monitor skin integrity  - Assess and monitor nutrition and hydration status  - Monitor labs (i e  albumin)  - Assess for incontinence   - Turn and reposition patient  - Assist with mobility/ambulation  - Relieve pressure over bony prominences  - Avoid friction and shearing  - Provide appropriate hygiene as needed including keeping skin clean and dry  - Evaluate need for skin moisturizer/barrier cream  - Collaborate with interdisciplinary team (i e  Nutrition, Rehabilitation, etc )   - Patient/family teaching  Outcome: Progressing     Problem: RESPIRATORY - ADULT  Goal: Achieves optimal ventilation and oxygenation  Description  INTERVENTIONS:  - Assess for changes in respiratory status  - Assess for changes in mentation and behavior  - Position to facilitate oxygenation and minimize respiratory effort  - Oxygen administration by appropriate delivery method based on oxygen saturation (per order) or ABGs  - Initiate smoking cessation education as indicated  - Encourage broncho-pulmonary hygiene including cough, deep breathe, Incentive Spirometry  - Assess the need for suctioning and aspirate as needed  - Assess and instruct to report SOB or any respiratory difficulty  - Respiratory Therapy support as indicated  Outcome: Progressing     Problem: GASTROINTESTINAL - ADULT  Goal: Maintains adequate nutritional intake  Description  INTERVENTIONS:  - Monitor percentage of each meal consumed  - Identify factors contributing to decreased intake, treat as appropriate  - Assist with meals as needed  - Monitor I&O, WT and lab values  - Obtain nutrition services referral as needed  Outcome: Progressing     Problem: SKIN/TISSUE INTEGRITY - ADULT  Goal: Skin integrity remains intact  Description  INTERVENTIONS  - Identify patients at risk for skin breakdown  - Assess and monitor skin integrity  - Assess and monitor nutrition and hydration status  - Monitor labs (i e  albumin)  - Assess for incontinence   - Turn and reposition patient  - Assist with mobility/ambulation  - Relieve pressure over bony prominences  - Avoid friction and shearing  - Provide appropriate hygiene as needed including keeping skin clean and dry  - Evaluate need for skin moisturizer/barrier cream  - Collaborate with interdisciplinary team (i e  Nutrition, Rehabilitation, etc )   - Patient/family teaching  Outcome: Progressing     Problem: MUSCULOSKELETAL - ADULT  Goal: Maintain or return mobility to safest level of function  Description  INTERVENTIONS:  - Assess patient's ability to carry out ADLs; assess patient's baseline for ADL function and identify physical deficits which impact ability to perform ADLs (bathing, care of mouth/teeth, toileting, grooming, dressing, etc )  - Assess/evaluate cause of self-care deficits   - Assess range of motion  - Assess patient's mobility; develop plan if impaired  - Assess patient's need for assistive devices and provide as appropriate  - Encourage maximum independence but intervene and supervise when necessary  - Involve family in performance of ADLs  - Assess for home care needs following discharge   - Request OT consult to assist with ADL evaluation and planning for discharge  - Provide patient education as appropriate  Outcome: Progressing

## 2019-07-23 NOTE — PLAN OF CARE
Problem: Potential for Falls  Goal: Patient will remain free of falls  Description  INTERVENTIONS:  - Assess patient frequently for physical needs  -  Identify cognitive and physical deficits and behaviors that affect risk of falls  -  [place] fall precautions as indicated by assessment   - Educate patient/family on patient safety including physical limitations  - Instruct patient to call for assistance with activity based on assessment  - Modify environment to reduce risk of injury  - Consider OT/PT consult to assist with strengthening/mobility  Outcome: Progressing     Problem: Nutrition/Hydration-ADULT  Goal: Nutrient/Hydration intake appropriate for improving, restoring or maintaining nutritional needs  Description  Monitor and assess patient's nutrition/hydration status for malnutrition (ex- brittle hair, bruises, dry skin, pale skin and conjunctiva, muscle wasting, smooth red tongue, and disorientation)  Collaborate with interdisciplinary team and initiate plan and interventions as ordered  Monitor patient's weight and dietary intake as ordered or per policy  Utilize nutrition screening tool and intervene per policy  Determine patient's food preferences and provide high-protein, high-caloric foods as appropriate       INTERVENTIONS:  - Monitor oral intake, urinary output, labs, and treatment plans  - Assess nutrition and hydration status and recommend course of action  - Evaluate amount of meals eaten  - Assist patient with eating if necessary   - Allow adequate time for meals  - Recommend/ encourage appropriate diets, oral nutritional supplements, and vitamin/mineral supplements  - Order, calculate, and assess calorie counts as needed  - Recommend, monitor, and adjust tube feedings and TPN/PPN based on assessed needs  - Assess need for intravenous fluids  - Provide specific nutrition/hydration education as appropriate  - Include patient/family/caregiver in decisions related to nutrition  Outcome: Progressing     Problem: Prexisting or High Potential for Compromised Skin Integrity  Goal: Skin integrity is maintained or improved  Description  INTERVENTIONS:  - Identify patients at risk for skin breakdown  - Assess and monitor skin integrity  - Assess and monitor nutrition and hydration status  - Monitor labs (i e  albumin)  - Assess for incontinence   - Turn and reposition patient  - Assist with mobility/ambulation  - Relieve pressure over bony prominences  - Avoid friction and shearing  - Provide appropriate hygiene as needed including keeping skin clean and dry  - Evaluate need for skin moisturizer/barrier cream  - Collaborate with interdisciplinary team (i e  Nutrition, Rehabilitation, etc )   - Patient/family teaching  Outcome: Progressing     Problem: RESPIRATORY - ADULT  Goal: Achieves optimal ventilation and oxygenation  Description  INTERVENTIONS:  - Assess for changes in respiratory status  - Assess for changes in mentation and behavior  - Position to facilitate oxygenation and minimize respiratory effort  - Oxygen administration by appropriate delivery method based on oxygen saturation (per order) or ABGs  - Initiate smoking cessation education as indicated  - Encourage broncho-pulmonary hygiene including cough, deep breathe, Incentive Spirometry  - Assess the need for suctioning and aspirate as needed  - Assess and instruct to report SOB or any respiratory difficulty  - Respiratory Therapy support as indicated  Outcome: Progressing     Problem: GASTROINTESTINAL - ADULT  Goal: Maintains adequate nutritional intake  Description  INTERVENTIONS:  - Monitor percentage of each meal consumed  - Identify factors contributing to decreased intake, treat as appropriate  - Assist with meals as needed  - Monitor I&O, WT and lab values  - Obtain nutrition services referral as needed  Outcome: Progressing     Problem: SKIN/TISSUE INTEGRITY - ADULT  Goal: Skin integrity remains intact  Description  INTERVENTIONS  - Identify patients at risk for skin breakdown  - Assess and monitor skin integrity  - Assess and monitor nutrition and hydration status  - Monitor labs (i e  albumin)  - Assess for incontinence   - Turn and reposition patient  - Assist with mobility/ambulation  - Relieve pressure over bony prominences  - Avoid friction and shearing  - Provide appropriate hygiene as needed including keeping skin clean and dry  - Evaluate need for skin moisturizer/barrier cream  - Collaborate with interdisciplinary team (i e  Nutrition, Rehabilitation, etc )   - Patient/family teaching  Outcome: Progressing     Problem: MUSCULOSKELETAL - ADULT  Goal: Maintain or return mobility to safest level of function  Description  INTERVENTIONS:  - Assess patient's ability to carry out ADLs; assess patient's baseline for ADL function and identify physical deficits which impact ability to perform ADLs (bathing, care of mouth/teeth, toileting, grooming, dressing, etc )  - Assess/evaluate cause of self-care deficits   - Assess range of motion  - Assess patient's mobility; develop plan if impaired  - Assess patient's need for assistive devices and provide as appropriate  - Encourage maximum independence but intervene and supervise when necessary  - Involve family in performance of ADLs  - Assess for home care needs following discharge   - Request OT consult to assist with ADL evaluation and planning for discharge  - Provide patient education as appropriate  Outcome: Progressing

## 2019-07-23 NOTE — PROGRESS NOTES
Progress Note - Brigid Silveira 3/13/1926, 80 y o  female MRN: [id number]    Unit/Bed#: [address] [place] [address] Encounter: [id number]    Primary Care Provider: Raquel Cao DO   Date and time admitted to hospital: 7/22/2019  1:32 PM        UTI (urinary tract infection)  Assessment & Plan  · Asymptomatic bacteria  · Urine culture pending  · Covered with antibiotics as listed above    Anemia  Assessment & Plan  -Hb 8 0  -check Fe panel, B12, folate (etiology currently unclear, w/u in progress)    CKD (chronic kidney disease) stage 4, GFR 15-29 ml/min ([facility])  Assessment & Plan  · At baseline creat 1 4-1 9    Aspiration pneumonia ([facility])  Assessment & Plan  -CXR:  Findings concerning for right upper lobe pneumonia   -Cont cefepime/vanco, stop flagyl  -follow Cxs  -Procal 0 26  -Speech eval (once taking PO)    Zenker's (hypopharyngeal) diverticulum  Assessment & Plan  -noted    Rectal bleeding  Assessment & Plan  · Occult neg per ER RN  · protonix IV while NPO    * Dysphagia  Assessment & Plan  -Seen in GI office on day of admission  -History of esophageal dilation 2014  -NPO for potential EGD with dilation, start IVFs  -Speech eval once pt taking PO again  -I personally discussed the case with Dr Radha Arceo over the phone  VTE Pharmacologic Prophylaxis:   Pharmacologic: Heparin (heme neg, Hb 8 0 noted, will closely monitor)  Mechanical VTE Prophylaxis in Place: Yes    Patient Centered Rounds: I have performed bedside rounds with nursing staff today  Discussions with Specialists or Other Care Team Provider: GI    Education and Discussions with Family / Patient: Pt, pt's niece over the phone  Time Spent for Care: 20 minutes  More than 50% of total time spent on counseling and coordination of care as described above      Current Length of Stay: 1 day(s)    Current Patient Status: Inpatient   Certification Statement: The patient will continue to require additional inpatient hospital stay due to PNA, dysphagia    Discharge Plan: 1-2 days    Code Status: Level 1 - Full Code      Subjective:   Pt without new complaints this AM  Denies CP/SOB  Objective:     Vitals:   Temp (24hrs), Av 3 °F (36 8 °C), Min:98 °F (36 7 °C), Max:99 °F (37 2 °C)    Temp:  [98 °F (36 7 °C)-99 °F (37 2 °C)] 98 1 °F (36 7 °C)  HR:  [64-94] 73  Resp:  [16-26] 16  BP: (108-153)/(55-68) 117/58  SpO2:  [93 %-97 %] 93 %  Body mass index is 20 96 kg/m²  Input and Output Summary (last 24 hours): Intake/Output Summary (Last 24 hours) at 2019 09  Last data filed at 2019 0916  Gross per 24 hour   Intake 350 ml   Output 200 ml   Net 150 ml       Physical Exam:     Physical Exam   Constitutional: She is oriented to person, place, and time  She appears well-developed and well-nourished  HENT:   Head: Normocephalic and atraumatic  Eyes: Pupils are equal, round, and reactive to light  EOM are normal    Neck: Normal range of motion  Neck supple  Cardiovascular: Normal rate and regular rhythm  Murmur (2/6 systolic murmur) heard  Pulmonary/Chest: Effort normal and breath sounds normal  No respiratory distress  Abdominal: Soft  Bowel sounds are normal    Musculoskeletal: She exhibits edema (trace LE edema)  Neurological: She is alert and oriented to person, place, and time  No cranial nerve deficit  Skin: Skin is warm and dry  Psychiatric: She has a normal mood and affect           Additional Data:     Labs:    Results from last 7 days   Lab Units 19  0452   WBC Thousand/uL 10 17*   HEMOGLOBIN g/dL 8 0*   HEMATOCRIT % 26 5*   PLATELETS Thousands/uL 351   NEUTROS PCT % 75   LYMPHS PCT % 15   MONOS PCT % 8   EOS PCT % 1     Results from last 7 days   Lab Units 19  0452  19  1407   SODIUM mmol/L 139   < > 135*   POTASSIUM mmol/L 3 9   < > 5 4*   CHLORIDE mmol/L 110*   < > 106   CO2 mmol/L 22   < > 22   BUN mg/dL 27*   < > 27*   CREATININE mg/dL 1 52*   < > 1 48*   ANION GAP mmol/L 7   < > 7 CALCIUM mg/dL 8 8   < > 9 6   ALBUMIN g/dL  --   --  2 0*   TOTAL BILIRUBIN mg/dL  --   --  0 30   ALK PHOS U/L  --   --  101   ALT U/L  --   --  16   AST U/L  --   --  34   GLUCOSE RANDOM mg/dL 102   < > 99    < > = values in this interval not displayed  Results from last 7 days   Lab Units 07/22/19  1407   INR  1 14     Results from last 7 days   Lab Units 07/23/19  0739   POC GLUCOSE mg/dl 117         Results from last 7 days   Lab Units 07/22/19  1550   PROCALCITONIN ng/ml 0 26*           * I Have Reviewed All Lab Data Listed Above  * Additional Pertinent Lab Tests Reviewed: Alicia [facility] Admission Reviewed    Recent Cultures (last 7 days):     Results from last 7 days   Lab Units 07/22/19  1603   LEGIONELLA URINARY ANTIGEN  Negative       Last 24 Hours Medication List:     Current Facility-Administered Medications:  acetaminophen 650 mg Oral Q6H PRN AVRIL Cisneros   bimatoprost 1 drop Left Eye HS AVRIL Black   cefepime 1,000 mg Intravenous Q24H AVRIL Black   heparin (porcine) 5,000 Units Subcutaneous Q8H [address] AVRIL Black   pantoprazole 40 mg Intravenous Daily AVRIL Black   sodium chloride 75 mL/hr Intravenous Continuous Audie Hidalgo MD   [START ON 7/24/2019] vancomycin 750 mg Intravenous Q48H AVRIL Cisneros        Today, Patient Was Seen By: Audie Hidalgo MD    ** Please Note: Dictation voice to text software may have been used in the creation of this document   **

## 2019-07-23 NOTE — PROGRESS NOTES
Vancomycin IV Pharmacy-to-Dose Consultation    Marjorie Soto is a 80 y o  female who is no longer receiving Vancomycin IV   It has been discontinued by the provider  Pharmacy will sign off with respect to Vancomycin dosing and management  The previously ordered Vancomycin trough and Pharmacy general consult order will be discontinued as well  Thank you for the opportunity to provide Pharmacy Consult services      Alex Robles, Pharmacist

## 2019-07-23 NOTE — PHYSICAL THERAPY NOTE
PT eval   07/23/19 1410   Note Type   Note type Eval only   Pain Assessment   Pain Assessment No/denies pain   Pain Score No Pain   Home Living   Type of Home Assisted living   Home Layout Able to live on main level with bedroom/bathroom   Home Equipment Walker   Prior Function   Level of [place] Needs assistance with IADLs; Needs assistance with ADLs and functional mobility   Lives With Alone   Receives Help From Personal care attendant   ADL Assistance Needs assistance   IADLs Needs assistance   Vocational Retired   General   Additional Pertinent History adm with dysphagia underwent BS today for EGD tomorrow  HAd some lunch agrees to eval/oob   Family/Caregiver Present Yes   Cognition   Overall Cognitive Status WFL   Arousal/Participation Alert   Orientation Level Oriented X4   Comments [place]   RUE Assessment   RUE Assessment   (grossly functional hand extensive arthritic changes)   LUE Assessment   LUE Assessment   (grosslyfunctional hands arthritic changes)   RLE Assessment   RLE Assessment   (arom wfl strength 3+/5)   LLE Assessment   LLE Assessment   (Arom wfl strength 3+/5)   Coordination   Movements are Fluid and Coordinated 0   Coordination and Movement Description hands impaired   Bed Mobility   Rolling R 4  Minimal assistance   Additional items Assist x 1;HOB elevated; Bedrails; Increased time required;LE management   Supine to Sit 4  Minimal assistance   Additional items Assist x 1;HOB elevated; Bedrails; Increased time required;LE management;Verbal cues   Transfers   Sit to Stand 4  Minimal assistance   Additional items Assist x 1;Bedrails;HOB elevated; Increased time required;Verbal cues   Stand to Sit 5  Supervision   Additional items Increased time required;Verbal cues;Armrests   Stand pivot 4  Minimal assistance   Additional items Assist x 1; Increased time required;Armrests  (RW)   Ambulation/Elevation   Gait pattern Narrow JUAN LUIS; Forward Flexion; Improper Weight shift  (marked thoracic Kyphosis)   Gait Assistance 3  Moderate assist   Additional items Assist x 1   Assistive Device Rolling walker   Distance 5'   Balance   Static Sitting Good   Dynamic Sitting Fair +   Static Standing Fair -   Dynamic Standing [address], [address] -   Activity Tolerance   Activity Tolerance Patient limited by fatigue   Nurse Made Aware yes   Assessment   Prognosis Good   Problem List Decreased strength;Decreased endurance; Impaired balance;Decreased mobility; Impaired hearing   Assessment PT able to transfer oob to chair with 1 assist  States she is less than 1/2 her baseline by her account  Normally able to transfer and amb ind with rw  REquires asssit for all mobility at present with decreased endurance  Will benefit from skilled PT services to regain safe ind functional mobility  Recommend in-pt rehab at/dc WIll follow see goals  Goals   Patient Goals get better   STG Expiration Date 07/31/19   Short Term Goal #1 1) improve strength 1/2 grade 2) safe ind transfers with rw 3) safe ind abm with rw 25' level 4) improve standing tolerance to 3 min   Plan   Treatment/Interventions ADL retraining;Functional transfer training;LE strengthening/ROM; Therapeutic exercise; Endurance training;Patient/family training;Equipment eval/education; Bed mobility;Gait training;Spoke to nursing;Spoke to case management   PT Frequency 5x/wk   Recommendation   Recommendation Short-term skilled PT   Equipment Recommended Walker   PT - OK to Discharge Yes   Barthel Index   Feeding 5   Bathing 0   Grooming Score 5   Dressing Score 5   Bladder Score 10   Bowels Score 10   Toilet Use Score 5   Transfers (Bed/Chair) Score 5   Mobility (Level Surface) Score 0   Stairs Score 0   Barthel Index Score 45   Milagros Joiner, PT

## 2019-07-23 NOTE — ASSESSMENT & PLAN NOTE
-CXR:  Findings concerning for right upper lobe pneumonia   -Cont cefepime/vanco, stop flagyl  -follow Cxs  -Procal 0 26  -Speech eval (once taking PO)

## 2019-07-23 NOTE — PROGRESS NOTES
Vancomycin IV Pharmacy-to-Dose Consultation    Max Aparicio is a 80 y o  female who is currently receiving Vancomycin IV with management by the Pharmacy Consult service  Assessment/Plan:  The patient was reviewed  Renal function is stable and no signs or symptoms of infusion reactions were documented in the chart  Based on todays assessment, continue current vancomycin (day # 3) dosing of 750 mg every 48 hours , with a plan for trough to be drawn at 15:30 on 7/26/19  We will continue to follow the patients culture results and clinical progress daily      Tobias Rm, Pharmacist

## 2019-07-23 NOTE — SOCIAL WORK
LOS: 1  Pt is not a documented bundle  Pt is not a readmission  Spoke with Brooke Bangura at [facility]([phone]), Pt is independent with walker and has setup with adls  Updated Brooke Bangura on Pt's status  Met with Pt  Pt presents AA&Ox2  Discussed role of   Pt confirms she uses walker and has help with adls  Pt confirms her dtr(Kirsten) is her POA  Pt agreeable for  to call her dtr(Kirsten)  Spoke with Pt's dtr(Kirsten), Pt's dtr confirmed  Pt has been at [facility] since 2016 and confirmed Pt uses walker  Pt's dtr informed Pt had VNA in past but unable to recall which agency  Pt's dtr informed that Pt has been to [facility] in past for SNF  Pt's dtr confirmed that she is Pt's POA and Pt has living will  Await PT/OT eval for discharge planning  Will follow

## 2019-07-23 NOTE — PROGRESS NOTES
Progress note - Gastroenterology   Jed Horta 80 y o  female MRN: [id number]  Unit/Bed#: [address] [facility] 203-01 Encounter: [id number]    ASSESSMENT and PLAN    1  Dysphagia - oropharyngeal and possibly esophageal - long hx of this  Had h/o zenker's diverticulum but refused surgery  A lot of coughing associated with swallowing - concerning for aspiration given pt found to have pneumonia at admission CXR  Will need speech and swallow evaluation today with a barium swallow  Pending this, will add on for EGD +/- dilation tomorrow AM  NPO p MDN  2  Aspiration pneumonia    3  Weight loss of 15 lbs in the past 6 months - poor appetite, also from coughing and swallowing  Nutrition consult  4  Zenker's diverticulum - known hx, although pt refuses any surgical intervention  Will obtain a barium esophagram to give a road map  5  Anemia in setting of CKD but also recently noted to have some BRBPR - last colonoscopy in 2006 with hemorrhoids  Pt does not want a colonoscopy but agreeable for EGD tomorrow  Follow H&H and monitor for overt GI bleeding  Chief Complaint   Patient presents with    Abnormal Lab     pt presents to ER after being increasingly weak, was seen at PCP and was told her hgb was 7 5  patient states having blood in stool and coughing up blood        SUBJECTIVE/HPI   No complaints  States she has poor appetite but ate 4 bites of the waffle this AM  Lost about 15lbs in the past 6 months  A lot of coughing with swallowing  /58 (BP Location: Left arm)   Pulse 73   Temp 98 1 °F (36 7 °C) (Oral)   Resp 16   Ht 4' 8" (1 422 m)   Wt 42 4 kg (93 lb 7 6 oz)   LMP  (LMP Unknown)   SpO2 93%   BMI 20 96 kg/m²     PHYSICALEXAM  General appearance: alert, appears stated age and cooperative  Hard of hearing     Head: Normocephalic, without obvious abnormality, atraumatic  Lungs: clear to auscultation bilaterally  Heart: regular rate and rhythm, S1, S2 normal, no murmur, click, rub or gallop  Abdomen: soft, non-tender; bowel sounds normal; no masses,  no organomegaly  Extremities: extremities normal, atraumatic, no cyanosis or edema  Neurologic: Grossly normal    Lab Results   Component Value Date    GLUCOSE 103 09/28/2015    CALCIUM 8 8 07/23/2019     09/28/2015    K 3 9 07/23/2019    CO2 22 07/23/2019     (H) 07/23/2019    BUN 27 (H) 07/23/2019    CREATININE 1 52 (H) 07/23/2019     Lab Results   Component Value Date    WBC 10 17 (H) 07/23/2019    HGB 8 0 (L) 07/23/2019    HCT 26 5 (L) 07/23/2019    MCV 86 07/23/2019     07/23/2019     Lab Results   Component Value Date    ALT 16 07/22/2019    AST 34 07/22/2019    ALKPHOS 101 07/22/2019    BILITOT 0 29 09/28/2015     No results found for: AMYLASE  No results found for: LIPASE  No results found for: IRON, TIBC, FERRITIN  Lab Results   Component Value Date    INR 1 14 07/22/2019       Counseling / Coordination of Care  Total floor / unit time spent today 30 minutes

## 2019-07-23 NOTE — SPEECH THERAPY NOTE
Speech Language/Pathology  Speech/Language Pathology  Assessment    Patient Name: Lee Abdullahi  EMZRG'D Date: 7/23/2019     Problem List  Patient Active Problem List   Diagnosis    Status post right elbow joint replacement    Closed fracture of left olecranon process    Dysphagia    Rectal bleeding    Colon cancer screening    Zenker's (hypopharyngeal) diverticulum    Aspiration pneumonia (HCC)    CKD (chronic kidney disease) stage 4, GFR 15-29 ml/min (HCC)    Anemia    UTI (urinary tract infection)     Past Medical History  Past Medical History:   Diagnosis Date    Arthritis     [facility]     CKD (chronic kidney disease)     Glaucoma     Gout     Hypertension     Macular degeneration     Muscle weakness     RA [facility]rheumatoid arthritis) [facility]     Wound healing, delayed     pt states chronic wound on left shoulder    Zenker's (hypopharyngeal) diverticulum      Past Surgical History  Past Surgical History:   Procedure Laterality Date    COLONOSCOPY  12/20/2006    Hemorrhoids    ELBOW FRACTURE REPAIR Left     Pins and screws      ESOPHAGEAL DILATION      EYE SURGERY      cataract bilaterally    HERNIA REPAIR      left inguinal    INGUINAL HERNIA REPAIR Left     JOINT REPLACEMENT      TX ARTHROPLASTY,ELBOW,TOTAL PROSTH REPL Right 4/19/2016    Procedure: ARTHROPLASTY ELBOW, SPLINT APPLICATION;  Surgeon: Vishal Grier MD;  Location:  MAIN OR;  Service: Orthopedics    TX OPEN 2016 [facility] Left 5/7/2018    Procedure: OPEN REDUCTION W/ INTERNAL FIXATION (ORIF) OF LEFT OLECRANON;  Surgeon: Tremayne Corea MD;  Location:  MAIN OR;  Service: Orthopedics    TOTAL ELBOW REPLACEMENT Right     After fracture    UPPER GASTROINTESTINAL ENDOSCOPY  10/03/2014    Small Zenker's with dilation to 50 Irish bougie, gastritis, negative for H pylori        Bedside Swallow Evaluation:    Summary:  Pt with h/o Zenker's Diverticulum demonstrated no clinical difficulty or s/s aspiration for po trials presented today until several minutes after the completion of po   ? Overflow/retropulsion from diverticulum vs esophageal dysphagia/reflux  Pt scheduled for EGD tomorrow  Will benefit from a very soft/moist diet with thin liquids and swallowing strategies to start followed by on-going assessment s/p endoscopy with possible dilation  Recommendations:  Diet: dysphagia level 2/mech soft; moist selections  Add extra gravy/sauce/condiments  Liquid: thin  Meds: crush  Supervision: assist  Positioning:Upright  Strategies: Pt to take PO/Meds only when fully alert and upright  Oral care: daily  Aspiration precautions  Reflux precautions    Therapy Prognosis: fair  Prognosis considerations: age, history, support  Frequency: tbd    Patient's goal: to eat better        HPI:  Pt is a 79 y/o F admitted to [facility] on 7/22/19 with worsening dysphagia for solids>liquids  Pt with known h/o Zenker's diverticulum for which she has refused surgical intervention  CXR yesterday indicated "findings concerning for RUL pneumonia"  Pt was seen by GI this morning with assessment/plan as follow:    ASSESSMENT and PLAN     1  Dysphagia - oropharyngeal and possibly esophageal - long hx of this  Had h/o zenker's diverticulum but refused surgery  A lot of coughing associated with swallowing - concerning for aspiration given pt found to have pneumonia at admission CXR  Will need speech and swallow evaluation today with a barium swallow  Pending this, will add on for EGD +/- dilation tomorrow AM  NPO p MDN       2  Aspiration pneumonia     3  Weight loss of 15 lbs in the past 6 months - poor appetite, also from coughing and swallowing  Nutrition consult      4  Zenker's diverticulum - known hx, although pt refuses any surgical intervention  Will obtain a barium esophagram to give a road map      5   Anemia in setting of CKD but also recently noted to have some BRBPR - last colonoscopy in 2006 with hemorrhoids  Pt does not want a colonoscopy but agreeable for EGD tomorrow  Follow H&H and monitor for overt GI bleeding  Barium swallow completed today:  Esophageal dysmotility with esophageal dilatation, numerous tertiary contractions, and delayed emptying of esophageal contents into the stomach  No mucosal abnormality or stricture      Zenker's diverticulum    Per discussion with pt, she reports 5-6 wk h/o worsening dysphagia  States she is fine while at the table eating but has noticed increased coughing after meals  When asked, pt generally walks back to her room after meals and lies down in bed  Reason for consult:  R/o aspiration  Determine safest and least restrictive diet  Current pneumonia  H/o zenker's diverticulum    Precautions:  Contact    Current diet:  Puree and nectar thick liquids  Premorbid diet[de-identified]  Regular with thin liquids  Previous VBS:  None here  O2 requirement:  Room air  Voice/Speech:  Wfls  Social:  Lives at [facility] personal care at [facility] [place] commands:  yes                        Cognitive Status:  Awake, alert, appropriate  [facility]  Dentition: ls; dental implants  Labial strength and ROM: [facility]  Lingual strength and ROM: [facility]  Mandibular strength and ROM: [facility]    Items administered:  Puree, hunter crackers, thin liquids  Liquids were taken by cup  Oral stage:  Lip closure: good  Mastication: effective  Bolus formation: [facility]  Bolus control: [facility]  Transfer: [facility]  Oral residue: no  Pocketing: no    Pharyngeal stage:  Swallow promptness: appeared prompt  Laryngeal rise: adequate per palpation  Wet voice: no  Throat clear: no  Cough: no immediate s/s aspiration observed  Coughing noted x 2 several minutes after final po trial   ? Overflow/retropulsion from Zenker's diverticulum    Secondary swallows: no  Audible swallows: no    Esophageal stage:  No s/s reported  H/o stricture w/ dilitation  H/o EGD    Aspiration precautions posted    Results d/w:  Pt, nursing, physician    Goal(s):  Pt will tolerate least restrictive diet w/out s/s aspiration or oral/pharyngeal difficulties

## 2019-07-23 NOTE — UTILIZATION REVIEW
Initial Clinical Review    Admission: Date/Time/Statement: 7/22/19 @ 1527     Orders Placed This Encounter   Procedures    Inpatient Admission (expected length of stay for this patient Order details is greater than two midnights)     Standing Status:   Standing     Number of Occurrences:   1     Order Specific Question:   Admitting Physician     Answer:   Yun Perales [[id number]]     Order Specific Question:   Level of Care     Answer:   Med Surg [[id number]]     Order Specific Question:   Estimated length of stay     Answer:   More than 2 Midnights     Order Specific Question:   Certification     Answer:   I certify that inpatient services are medically necessary for this patient for a duration of greater than two midnights  See H&P and MD Progress Notes for additional information about the patient's course of treatment  ED Arrival Information     Expected Arrival Acuity Means of Arrival Escorted By Service Admission Type    - 7/22/2019 13:22 Urgent Wheelchair Family Member General Medicine Urgent    Arrival Complaint    -        Chief Complaint   Patient presents with    Abnormal Lab     pt presents to ER after being increasingly weak, was seen at PCP and was told her hgb was 7 5  patient states having blood in stool and coughing up blood      Assessment/Plan: 80 y o  female with past medical history of CKD stage 4, hypertension, Zenker's diverticulum, esophageal stricture status post dilation 2014 who presents with dysphagia x6 weeks from GI office  Dysphagia  Assessment & Plan  · Seen in GI office today  · History of esophageal dilation 2014  · NPO for potential EGD with dilation a m    · Speech eval     Aspiration pneumonia (HCC)  Assessment & Plan  · RUL PNA  · Cont cefepime/vanco/flagyl  · BC/UC/procal pending  · Speech eval     Rectal bleeding  Assessment & Plan  · Occult neg per ER RN  · Check add'l occult  · protonix IV while NPO     UTI (urinary tract infection)  Assessment & Plan  · Asymptomatic bacteria  · Urine culture pending  · Covered with antibiotics as listed above     CKD (chronic kidney disease) stage 4, GFR 15-29 ml/min ([facility])  Assessment & Plan  · At baseline creat 1 4-1 9  ED Triage Vitals   Temperature Pulse Respirations Blood Pressure SpO2   07/22/19 1344 07/22/19 1341 07/22/19 1341 07/22/19 1343 07/22/19 1341   98 2 °F (36 8 °C) 75 19 153/68 94 %      Temp Source Heart Rate Source Patient Position - Orthostatic VS BP Location FiO2 (%)   07/22/19 1344 07/22/19 1341 07/22/19 1341 07/22/19 1341 --   Temporal Monitor Lying Right arm       Pain Score       07/22/19 1341       No Pain        Wt Readings from Last 1 Encounters:   07/23/19 42 4 kg (93 lb 7 6 oz)     Additional Vital Signs:   07/23/19 0735  98 1 °F (36 7 °C)  73  16  117/58  93 %  None (Room air)  Lying   07/22/19 2300  99 °F (37 2 °C)  94  16  139/60  93 %  None (Room air)  Lying   07/22/19 1908  98 °F (36 7 °C)  78  16  117/57  94 %  None (Room air)  Lying   07/22/19 1900    72  26Abnormal   146/61         07/22/19 1530    64  22  108/55  94 %       07/22/19 1500    71  24Abnormal   130/61  95 %           Pertinent Labs/Diagnostic Test Results:   7/22/2019 CxR- Findings concerning for right upper lobe pneumonia  Results from last 7 days   Lab Units 07/23/19  0452 07/22/19  1407   WBC Thousand/uL 10 17* 13 24*   HEMOGLOBIN g/dL 8 0* 9 0*   HEMATOCRIT % 26 5* 29 1*   PLATELETS Thousands/uL 351 408*   NEUTROS ABS Thousands/µL 7 58 9 80*         Results from last 7 days   Lab Units 07/23/19  0452 07/22/19  1436 07/22/19  1407   SODIUM mmol/L 139 138 135*   POTASSIUM mmol/L 3 9 4 3 5 4*   CHLORIDE mmol/L 110* 106 106   CO2 mmol/L 22 24 22   ANION GAP mmol/L 7 8 7   BUN mg/dL 27* 27* 27*   CREATININE mg/dL 1 52* 1 48* 1 48*   EGFR ml/min/1 73sq m 29 30 30   CALCIUM mg/dL 8 8 9 3 9 6     Results from last 7 days   Lab Units 07/22/19  1407   AST U/L 34   ALT U/L 16   ALK PHOS U/L 101   TOTAL PROTEIN g/dL 7 1   ALBUMIN g/dL 2 0* TOTAL BILIRUBIN mg/dL 0 30     Results from last 7 days   Lab Units 07/23/19  0739   POC GLUCOSE mg/dl 117     Results from last 7 days   Lab Units 07/23/19  0452 07/22/19  1436 07/22/19  1407   GLUCOSE RANDOM mg/dL 102 99 99     Results from last 7 days   Lab Units 07/22/19  1436   TROPONIN I ng/mL 0 03     Results from last 7 days   Lab Units 07/22/19  1407   PROTIME seconds 14 3   INR  1 14   PTT seconds 29     Results from last 7 days   Lab Units 07/22/19  1550   PROCALCITONIN ng/ml 0 26*     Results from last 7 days   Lab Units 07/23/19  0452   FERRITIN ng/mL 82     Results from last 7 days   Lab Units 07/22/19  1603   CLARITY UA  Cloudy   COLOR UA  Yellow   SPEC GRAV UA  1 010   PH UA  5 5   GLUCOSE UA mg/dl Negative   KETONES UA mg/dl Negative   BLOOD UA  Negative   PROTEIN UA mg/dl Trace*   NITRITE UA  Negative   BILIRUBIN UA  Negative   UROBILINOGEN UA E U /dl 0 2   LEUKOCYTES UA  Moderate*   WBC UA /hpf 30-50*   RBC UA /hpf 2-4*   BACTERIA UA /hpf Moderate*   EPITHELIAL CELLS WET PREP /hpf Occasional     Results from last 7 days   Lab Units 07/22/19  1603   STREP PNEUMONIAE ANTIGEN, URINE  Negative   LEGIONELLA URINARY ANTIGEN  Negative     ED Treatment:   Medication Administration from 07/22/2019 1322 to 07/22/2019 2005       Date/Time Order Dose Route Action Comments     07/22/2019 1715 cefepime (MAXIPIME) 2 g/50 mL dextrose IVPB 2,000 mg Intravenous New Bag      07/22/2019 1605 vancomycin (VANCOCIN) 500 mg in sodium chloride 0 9 % 100 mL IVPB 500 mg Intravenous New Bag      07/22/2019 1747 metroNIDAZOLE (FLAGYL) IVPB (premix) 500 mg 500 mg Intravenous New Bag         Past Medical History:   Diagnosis Date    Arthritis     [facility]     CKD (chronic kidney disease)     Glaucoma     Gout     Hypertension     Macular degeneration     Muscle weakness     RA [facility]rheumatoid [facility]) [facility] [address] [facility]     Wound healing, delayed     pt states chronic wound on left shoulder    Zenker's (hypopharyngeal) diverticulum      Present on Admission:   Aspiration pneumonia ([address] )   Dysphagia   CKD (chronic kidney disease) stage 4, GFR 15-29 ml/min ([facility])   Rectal bleeding   Anemia   Zenker's (hypopharyngeal) diverticulum   UTI (urinary tract infection)      Admitting Diagnosis: Anemia [D64 9]  Healthcare-associated pneumonia [J18 9]  Abnormal laboratory test [R89 9]  Difficulty swallowing solids [R13 10]  Dysphagia, unspecified type [R13 10]  Age/Sex: 80 y o  female  Admission Orders: 77/22/2019  1527 INPATIENT     Current Facility-Administered Medications:  acetaminophen 650 mg Oral Q6H PRN    bimatoprost 1 drop Left Eye HS    cefepime 1,000 mg Intravenous Q24H    heparin (porcine) 5,000 Units Subcutaneous Q8H [facility]    pantoprazole 40 mg Intravenous Daily    sodium chloride 75 mL/hr Intravenous Continuous Last Rate: 75 mL/hr (07/23/19 1124)   [START ON 7/24/2019] vancomycin 750 mg Intravenous Q48H        IP CONSULT TO INTERNAL MEDICINE  IP CONSULT TO GASTROENTEROLOGY    Speech - barium swallow  Urine culture  Stool occult blood  Network Utilization Review Department  Phone: [phone]; Fax [phone]  [email or web address]  ATTENTION: Please call with any questions or concerns to [phone]  and carefully listen to the prompts so that you are directed to the right person  Send all requests for admission clinical reviews, approved or denied determinations and any other requests to fax [phone]   All voicemails are confidential

## 2019-07-24 ENCOUNTER — ANESTHESIA EVENT (INPATIENT)
Dept: GASTROENTEROLOGY | Facility: HOSPITAL | Age: 84
DRG: 811 | End: 2019-07-24
Payer: MEDICARE

## 2019-07-24 ENCOUNTER — ANESTHESIA (INPATIENT)
Dept: GASTROENTEROLOGY | Facility: HOSPITAL | Age: 84
DRG: 811 | End: 2019-07-24
Payer: MEDICARE

## 2019-07-24 ENCOUNTER — APPOINTMENT (OUTPATIENT)
Dept: GASTROENTEROLOGY | Facility: HOSPITAL | Age: 84
DRG: 811 | End: 2019-07-24
Attending: INTERNAL MEDICINE
Payer: MEDICARE

## 2019-07-24 PROBLEM — N39.0 UTI (URINARY TRACT INFECTION): Status: RESOLVED | Noted: 2019-07-22 | Resolved: 2019-07-24

## 2019-07-24 PROBLEM — D50.9 IRON DEFICIENCY ANEMIA: Status: ACTIVE | Noted: 2019-07-22

## 2019-07-24 LAB
ATRIAL RATE: 69 BPM
GLUCOSE SERPL-MCNC: 103 MG/DL (ref 65–140)
MRSA NOSE QL CULT: NORMAL
P AXIS: 43 DEGREES
PR INTERVAL: 226 MS
QRS AXIS: -20 DEGREES
QRSD INTERVAL: 86 MS
QT INTERVAL: 362 MS
QTC INTERVAL: 387 MS
T WAVE AXIS: 127 DEGREES
VENTRICULAR RATE: 69 BPM

## 2019-07-24 PROCEDURE — C9113 INJ PANTOPRAZOLE SODIUM, VIA: HCPCS | Performed by: NURSE PRACTITIONER

## 2019-07-24 PROCEDURE — 99233 SBSQ HOSP IP/OBS HIGH 50: CPT | Performed by: HOSPITALIST

## 2019-07-24 PROCEDURE — 43235 EGD DIAGNOSTIC BRUSH WASH: CPT | Performed by: INTERNAL MEDICINE

## 2019-07-24 PROCEDURE — 0DJ08ZZ INSPECTION OF UPPER INTESTINAL TRACT, VIA NATURAL OR ARTIFICIAL OPENING ENDOSCOPIC: ICD-10-PCS | Performed by: INTERNAL MEDICINE

## 2019-07-24 PROCEDURE — 82948 REAGENT STRIP/BLOOD GLUCOSE: CPT

## 2019-07-24 PROCEDURE — 93010 ELECTROCARDIOGRAM REPORT: CPT | Performed by: INTERNAL MEDICINE

## 2019-07-24 RX ORDER — PROPOFOL 10 MG/ML
INJECTION, EMULSION INTRAVENOUS AS NEEDED
Status: DISCONTINUED | OUTPATIENT
Start: 2019-07-24 | End: 2019-07-24 | Stop reason: SURG

## 2019-07-24 RX ADMIN — METRONIDAZOLE 500 MG: 500 INJECTION, SOLUTION INTRAVENOUS at 21:12

## 2019-07-24 RX ADMIN — PROPOFOL 20 MG: 10 INJECTION, EMULSION INTRAVENOUS at 13:44

## 2019-07-24 RX ADMIN — AZITHROMYCIN 500 MG: 250 TABLET, FILM COATED ORAL at 15:56

## 2019-07-24 RX ADMIN — PANTOPRAZOLE SODIUM 40 MG: 40 INJECTION, POWDER, FOR SOLUTION INTRAVENOUS at 10:00

## 2019-07-24 RX ADMIN — METRONIDAZOLE 500 MG: 500 INJECTION, SOLUTION INTRAVENOUS at 14:43

## 2019-07-24 RX ADMIN — SODIUM CHLORIDE 75 ML/HR: 0.9 INJECTION, SOLUTION INTRAVENOUS at 02:24

## 2019-07-24 RX ADMIN — BIMATOPROST 1 DROP: 0.1 SOLUTION/ DROPS OPHTHALMIC at 21:13

## 2019-07-24 RX ADMIN — IRON SUCROSE 300 MG: 20 INJECTION, SOLUTION INTRAVENOUS at 15:57

## 2019-07-24 RX ADMIN — CEFTRIAXONE 1000 MG: 1 INJECTION, SOLUTION INTRAVENOUS at 15:18

## 2019-07-24 RX ADMIN — HEPARIN SODIUM 5000 UNITS: 5000 INJECTION INTRAVENOUS; SUBCUTANEOUS at 14:38

## 2019-07-24 RX ADMIN — SODIUM CHLORIDE: 0.9 INJECTION, SOLUTION INTRAVENOUS at 13:33

## 2019-07-24 RX ADMIN — PROPOFOL 30 MG: 10 INJECTION, EMULSION INTRAVENOUS at 13:34

## 2019-07-24 RX ADMIN — HEPARIN SODIUM 5000 UNITS: 5000 INJECTION INTRAVENOUS; SUBCUTANEOUS at 21:12

## 2019-07-24 NOTE — ANESTHESIA PREPROCEDURE EVALUATION
Review of Systems/Medical History  Patient summary reviewed        Cardiovascular  Hypertension , CHF compensated CHF,    Pulmonary  Pneumonia,        GI/Hepatic            Endo/Other     GYN       Hematology  Anemia ,     Musculoskeletal  Rheumatoid arthritis ,   Arthritis     Neurology   Psychology           Physical Exam    Airway    Mallampati score: II  TM Distance: >3 FB  Neck ROM: full     Dental   No notable dental hx     Cardiovascular  Rhythm: regular, Rate: normal, Cardiovascular exam normal    Pulmonary  Decreased breath sounds,     Other Findings        Anesthesia Plan  ASA Score- 3     Anesthesia Type- IV sedation with anesthesia with ASA Monitors  Additional Monitors:   Airway Plan:     Comment: Benefits and risks of planned anesthetic discussed with patient, and agrees to proceed  I, Dr Khari Lopez, the attending anesthesiologist, have personally seen and evaluated the patient prior to anesthetic care  I have reviewed the preanesthetic record, and other medical records if appropriate to the anesthetic care  If a CRNA is involved in the case, I have reviewed the CRNA assessment, if present, and agree  The patient is in a suitable condition to proceed with my formulated anesthetic plan        Plan Factors-    Induction- intravenous  Postoperative Plan-     Informed Consent- Anesthetic plan and risks discussed with patient

## 2019-07-24 NOTE — INTERIM OP NOTE
EGD  Postoperative Note  PATIENT NAME: Merlin Granado  : 3/13/1926  MRN: [id number]    IMPRESSION:  Normal mucosa in the esophagus  Thick secretions noted throughout the oropharynx into the esophageal lumen  Small hiatal hernia  Normal mucosa in the stomach and duodenum       RECOMMENDATION:  Resume diet per speech recommendations  Resume home meds  Nutrition consult  Aspiration Precautions      SIGNATURE: Douglas Huff MD   DATE: 2019   TIME: 2:10 PM

## 2019-07-24 NOTE — NUTRITION
07/24/19 1147   Assessment   Timepoint Reassess   Labs   List Completed Labs   (7/23/19 BUN 27, Creat 1 52 meds: protonix, [email or web address])   Feeding Route   PO Independent   Adequacy of Intake   Nutrition Modality NPO   Estimated calorie intake compared to estimated need Pt not meeting her estimated needs while NPO  Nutrition Prognosis   Nutrition Concerns Dysphagia; Elderly  (per chart review: dysphagia, aspiration PNA, h/o Zenkers diverticulum: for EGD today, UTI  ST recs: mechancial soft w/ thins  no skin issues noted per nursing documentation  )   Nutrition Considerations   (diet ed: not appropriate currently  )   PES Statement   Problem Continue previous diagnosis   Patient Nutrition Goals   Goal meet PO needs   Goal Status not met;extended   Timeframe to complete goal by next f/u   Recommendations/Interventions   Summary EGD planned today  Discussed with ST: daughter requesting on po advance that pt is put on Pureeds vs Mechanical Soft  Pt to benefit from po supplement on diet advance  Daughter agreeable for us to send Ensure Chocolate BID  Malnutrition/BMI Present Yes   Malnutrition type Chronic illness   Degree of Malnutrition Malnutrition of moderate degree   Malnutrition Characteristics Fat loss;Muscle loss; Inadequate energy  (Pt presents with moderate malnutrition as evidenced by <75% po intake compared to estimated needs >2-3 months, clavicle protrusion and orbital hollowing  Treat with diet and supplements BID  )   Nutrition Recommendations Initiate diet  (as medically able to Pureed with thins   Add Ensure Enlive Choclate BID when diet advanced  )   Nutrition Complexity Risk   Nutrition complexity level Moderate risk   Follow up date 07/26/19  (diet advance, PO intake, supplement intake  )

## 2019-07-24 NOTE — MALNUTRITION/BMI
This medical record reflects one or more clinical indicators suggestive of malnutrition and/or morbid obesity  Malnutrition Findings:   Malnutrition type: Chronic illness  Degree of Malnutrition: Malnutrition of moderate degree  Malnutrition Characteristics: Fat loss, Muscle loss, Inadequate energy(Pt presents with moderate malnutrition as evidenced by <75% po intake compared to estimated needs >2-3 months, clavicle protrusion and orbital hollowing  Treat with diet and supplements BID  )    BMI Findings: Body mass index is 20 31 kg/m²  See Nutrition note dated 7/24/19 for additional details  Completed nutrition assessment is viewable in the nutrition documentation

## 2019-07-24 NOTE — PLAN OF CARE
Problem: Potential for Falls  Goal: Patient will remain free of falls  Description  INTERVENTIONS:  - Assess patient frequently for physical needs  -  Identify cognitive and physical deficits and behaviors that affect risk of falls  -  Perry fall precautions as indicated by assessment   - Educate patient/family on patient safety including physical limitations  - Instruct patient to call for assistance with activity based on assessment  - Modify environment to reduce risk of injury  - Consider OT/PT consult to assist with strengthening/mobility  Outcome: Progressing     Problem: Nutrition/Hydration-ADULT  Goal: Nutrient/Hydration intake appropriate for improving, restoring or maintaining nutritional needs  Description  Monitor and assess patient's nutrition/hydration status for malnutrition (ex- brittle hair, bruises, dry skin, pale skin and conjunctiva, muscle wasting, smooth red tongue, and disorientation)  Collaborate with interdisciplinary team and initiate plan and interventions as ordered  Monitor patient's weight and dietary intake as ordered or per policy  Utilize nutrition screening tool and intervene per policy  Determine patient's food preferences and provide high-protein, high-caloric foods as appropriate       INTERVENTIONS:  - Monitor oral intake, urinary output, labs, and treatment plans  - Assess nutrition and hydration status and recommend course of action  - Evaluate amount of meals eaten  - Assist patient with eating if necessary   - Allow adequate time for meals  - Recommend/ encourage appropriate diets, oral nutritional supplements, and vitamin/mineral supplements  - Order, calculate, and assess calorie counts as needed  - Recommend, monitor, and adjust tube feedings and TPN/PPN based on assessed needs  - Assess need for intravenous fluids  - Provide specific nutrition/hydration education as appropriate  - Include patient/family/caregiver in decisions related to nutrition  Outcome: Progressing     Problem: Prexisting or High Potential for Compromised Skin Integrity  Goal: Skin integrity is maintained or improved  Description  INTERVENTIONS:  - Identify patients at risk for skin breakdown  - Assess and monitor skin integrity  - Assess and monitor nutrition and hydration status  - Monitor labs (i e  albumin)  - Assess for incontinence   - Turn and reposition patient  - Assist with mobility/ambulation  - Relieve pressure over bony prominences  - Avoid friction and shearing  - Provide appropriate hygiene as needed including keeping skin clean and dry  - Evaluate need for skin moisturizer/barrier cream  - Collaborate with interdisciplinary team (i e  Nutrition, Rehabilitation, etc )   - Patient/family teaching  Outcome: Progressing     Problem: RESPIRATORY - ADULT  Goal: Achieves optimal ventilation and oxygenation  Description  INTERVENTIONS:  - Assess for changes in respiratory status  - Assess for changes in mentation and behavior  - Position to facilitate oxygenation and minimize respiratory effort  - Oxygen administration by appropriate delivery method based on oxygen saturation (per order) or ABGs  - Initiate smoking cessation education as indicated  - Encourage broncho-pulmonary hygiene including cough, deep breathe, Incentive Spirometry  - Assess the need for suctioning and aspirate as needed  - Assess and instruct to report SOB or any respiratory difficulty  - Respiratory Therapy support as indicated  Outcome: Progressing     Problem: GASTROINTESTINAL - ADULT  Goal: Maintains adequate nutritional intake  Description  INTERVENTIONS:  - Monitor percentage of each meal consumed  - Identify factors contributing to decreased intake, treat as appropriate  - Assist with meals as needed  - Monitor I&O, WT and lab values  - Obtain nutrition services referral as needed  Outcome: Progressing     Problem: SKIN/TISSUE INTEGRITY - ADULT  Goal: Skin integrity remains intact  Description  INTERVENTIONS  - Identify patients at risk for skin breakdown  - Assess and monitor skin integrity  - Assess and monitor nutrition and hydration status  - Monitor labs (i e  albumin)  - Assess for incontinence   - Turn and reposition patient  - Assist with mobility/ambulation  - Relieve pressure over bony prominences  - Avoid friction and shearing  - Provide appropriate hygiene as needed including keeping skin clean and dry  - Evaluate need for skin moisturizer/barrier cream  - Collaborate with interdisciplinary team (i e  Nutrition, Rehabilitation, etc )   - Patient/family teaching  Outcome: Progressing     Problem: MUSCULOSKELETAL - ADULT  Goal: Maintain or return mobility to safest level of function  Description  INTERVENTIONS:  - Assess patient's ability to carry out ADLs; assess patient's baseline for ADL function and identify physical deficits which impact ability to perform ADLs (bathing, care of mouth/teeth, toileting, grooming, dressing, etc )  - Assess/evaluate cause of self-care deficits   - Assess range of motion  - Assess patient's mobility; develop plan if impaired  - Assess patient's need for assistive devices and provide as appropriate  - Encourage maximum independence but intervene and supervise when necessary  - Involve family in performance of ADLs  - Assess for home care needs following discharge   - Request OT consult to assist with ADL evaluation and planning for discharge  - Provide patient education as appropriate  Outcome: Progressing

## 2019-07-24 NOTE — PHYSICAL THERAPY NOTE
Physical Therapy Cancellation Note    Patient off floor for endoscopy  Will continue to follow  Jonathan Lung   Julio Cesar, PT

## 2019-07-24 NOTE — SPEECH THERAPY NOTE
Speech Language/Pathology  Pt NPO, for EGD  Spoke w/ dietician, family has reported that the pt & they would prefer she be on puree when she is allowed to take po following her procedure  Will follow as able

## 2019-07-24 NOTE — PROGRESS NOTES
Progress Note - Maria C Ba 3/13/1926, 80 y o  female MRN: [id number]    Unit/Bed#: [address] [place] [address] Encounter: [id number]    Primary Care Provider: Thom Kennedy DO   Date and time admitted to hospital: 2019  1:32 PM        Iron deficiency anemia  Assessment & Plan  -Hb 8 0  -Fe low, start IV Venofer  -B12, folate nl    CKD (chronic kidney disease) stage 4, GFR 15-29 ml/min ([facility])  Assessment & Plan  · At baseline creat 1 4-1 9    Aspiration pneumonia ([facility])  Assessment & Plan  -CXR:  Findings concerning for right upper lobe pneumonia  -Currently on Azithro/Rocephin, add Flagyl while NPO  -Change abx to Augmentin once taking PO  -Strep PNA/legionella/BCx NEG  -Procal 0 26  -Speech eval (once taking PO)    Zenker's (hypopharyngeal) diverticulum  Assessment & Plan  -noted    Rectal bleeding  Assessment & Plan  · Occult neg per ER RN  · protonix IV while NPO    * Dysphagia  Assessment & Plan  -EGD today  -Speech eval once pt taking PO again      UTI (urinary tract infection)resolved as of 2019  Assessment & Plan  · Asymptomatic bacteria  · Urine culture pending  · Covered with antibiotics as listed above      VTE Pharmacologic Prophylaxis:   Pharmacologic: Heparin  Mechanical VTE Prophylaxis in Place: Yes    Patient Centered Rounds: I have performed bedside rounds with nursing staff today  Education and Discussions with Family / Patient: Pt    Time Spent for Care: 20 minutes  More than 50% of total time spent on counseling and coordination of care as described above  Current Length of Stay: 2 day(s)    Current Patient Status: Inpatient   Certification Statement: The patient will continue to require additional inpatient hospital stay due to dysphagia    Discharge Plan: tomorrow    Code Status: Level 1 - Full Code      Subjective:   Patient denies chest pain, shortness of breath, abdominal pain      Objective:     Vitals:   Temp (24hrs), Av °F (36 7 °C), Min:97 5 °F (36 4 °C), Max:98 4 °F (36 9 °C)    Temp:  [97 5 °F (36 4 °C)-98 4 °F (36 9 °C)] 98 4 °F (36 9 °C)  HR:  [75-76] 76  Resp:  [16-20] 18  BP: (124-135)/(56-58) 135/58  SpO2:  [93 %-95 %] 93 %  Body mass index is 20 31 kg/m²  Input and Output Summary (last 24 hours):     No intake or output data in the 24 hours ending 07/24/19 1151    Physical Exam:     Physical Exam   Constitutional: She is oriented to person, place, and time  She appears well-developed and well-nourished  HENT:   Head: Normocephalic and atraumatic  Eyes: Pupils are equal, round, and reactive to light  EOM are normal    Neck: Normal range of motion  Neck supple  Cardiovascular: Normal rate, regular rhythm and normal heart sounds  Pulmonary/Chest: Effort normal and breath sounds normal    Abdominal: Soft  Bowel sounds are normal    Neurological: She is alert and oriented to person, place, and time  No cranial nerve deficit  Skin: Skin is warm and dry  Psychiatric: She has a normal mood and affect  Additional Data:     Labs:    Results from last 7 days   Lab Units 07/23/19  0452   WBC Thousand/uL 10 17*   HEMOGLOBIN g/dL 8 0*   HEMATOCRIT % 26 5*   PLATELETS Thousands/uL 351   NEUTROS PCT % 75   LYMPHS PCT % 15   MONOS PCT % 8   EOS PCT % 1     Results from last 7 days   Lab Units 07/23/19  0452  07/22/19  1407   SODIUM mmol/L 139   < > 135*   POTASSIUM mmol/L 3 9   < > 5 4*   CHLORIDE mmol/L 110*   < > 106   CO2 mmol/L 22   < > 22   BUN mg/dL 27*   < > 27*   CREATININE mg/dL 1 52*   < > 1 48*   ANION GAP mmol/L 7   < > 7   CALCIUM mg/dL 8 8   < > 9 6   ALBUMIN g/dL  --   --  2 0*   TOTAL BILIRUBIN mg/dL  --   --  0 30   ALK PHOS U/L  --   --  101   ALT U/L  --   --  16   AST U/L  --   --  34   GLUCOSE RANDOM mg/dL 102   < > 99    < > = values in this interval not displayed       Results from last 7 days   Lab Units 07/22/19  1407   INR  1 14     Results from last 7 days   Lab Units 07/24/19  0628 07/23/19  0739   POC GLUCOSE mg/dl 103 117 Results from last 7 days   Lab Units 07/22/19  1550   PROCALCITONIN ng/ml 0 26*           * I Have Reviewed All Lab Data Listed Above  * Additional Pertinent Lab Tests Reviewed: [facility] Admission Reviewed    Recent Cultures (last 7 days):     Results from last 7 days   Lab Units 07/22/19  1603 07/22/19  1550   BLOOD CULTURE   --  No Growth at 24 hrs  No Growth at 24 hrs  URINE CULTURE  50,000-59,000 cfu/ml   --    LEGIONELLA URINARY ANTIGEN  Negative  --        Last 24 Hours Medication List:     Current Facility-Administered Medications:  acetaminophen 650 mg Oral Q6H PRN AVRIL Feliciano    azithromycin 500 mg Oral Q24H Tisa Galeazzi, MD    bimatoprost 1 drop Left Eye HS AVRIL Black    cefTRIAXone 1,000 mg Intravenous Q24H Tisa Galeazzi, MD Last Rate: 1,000 mg (07/23/19 1643)   heparin (porcine) 5,000 Units Subcutaneous Q8H [facility] AVRIL Black    iron sucrose 300 mg Intravenous Once Tisa Galeazzi, MD    metroNIDAZOLE 500 mg Intravenous Michael Mahan MD    pantoprazole 40 mg Intravenous Daily AVRIL Feliciano    sodium chloride 75 mL/hr Intravenous Continuous Tisa Galeazzi, MD Last Rate: 75 mL/hr (07/24/19 0224)        Today, Patient Was Seen By: Tisa Galeazzi, MD    ** Please Note: Dictation voice to text software may have been used in the creation of this document   ** 23-Aug-2018 19:23

## 2019-07-24 NOTE — SOCIAL WORK
Continue to follow  Met with Pt's dtr(Kirsten: [phone])ZD Pt's bedside  Discussed SNF recommendation  Pt's dtr and Pt in agreement for SNF  [place] [facility] given  Pt and Pt's dtr requesting [facility]  Referral sent to [facility] via [facility] can accept Pt once medically stable  Spoke with Pt's dtr(Kirsten), informed that [facility] can accept Pt  Pt's dtr in agreement  Pt's dtr informed of tentative discharge for 7/25  Pt's dtr informed that family member can transport Pt to [facility]  Pt informed that [facility] can accept her for SNF  Pt in agreement  Will follow

## 2019-07-24 NOTE — ASSESSMENT & PLAN NOTE
-CXR:  Findings concerning for right upper lobe pneumonia    -Currently on Azithro/Rocephin, add Flagyl while NPO  -Change abx to Augmentin once taking PO  -Strep PNA/legionella/BCx NEG  -Procal 0 26  -Speech eval (once taking PO)

## 2019-07-25 ENCOUNTER — TRANSITIONAL CARE MANAGEMENT (OUTPATIENT)
Dept: FAMILY MEDICINE CLINIC | Facility: CLINIC | Age: 84
End: 2019-07-25

## 2019-07-25 VITALS
HEIGHT: 56 IN | HEART RATE: 104 BPM | DIASTOLIC BLOOD PRESSURE: 66 MMHG | TEMPERATURE: 97.9 F | BODY MASS INDEX: 20.33 KG/M2 | RESPIRATION RATE: 16 BRPM | SYSTOLIC BLOOD PRESSURE: 156 MMHG | OXYGEN SATURATION: 90 % | WEIGHT: 90.39 LBS

## 2019-07-25 PROBLEM — E44.0 MODERATE PROTEIN-CALORIE MALNUTRITION (HCC): Status: ACTIVE | Noted: 2019-07-25

## 2019-07-25 LAB
ANION GAP SERPL CALCULATED.3IONS-SCNC: 9 MMOL/L (ref 4–13)
BUN SERPL-MCNC: 22 MG/DL (ref 5–25)
CALCIUM SERPL-MCNC: 8.8 MG/DL (ref 8.3–10.1)
CHLORIDE SERPL-SCNC: 112 MMOL/L (ref 100–108)
CO2 SERPL-SCNC: 22 MMOL/L (ref 21–32)
CREAT SERPL-MCNC: 1.35 MG/DL (ref 0.6–1.3)
ERYTHROCYTE [DISTWIDTH] IN BLOOD BY AUTOMATED COUNT: 18.6 % (ref 11.6–15.1)
GFR SERPL CREATININE-BSD FRML MDRD: 34 ML/MIN/1.73SQ M
GLUCOSE SERPL-MCNC: 110 MG/DL (ref 65–140)
HCT VFR BLD AUTO: 27.1 % (ref 34.8–46.1)
HGB BLD-MCNC: 8.2 G/DL (ref 11.5–15.4)
MCH RBC QN AUTO: 25.8 PG (ref 26.8–34.3)
MCHC RBC AUTO-ENTMCNC: 30.3 G/DL (ref 31.4–37.4)
MCV RBC AUTO: 85 FL (ref 82–98)
PLATELET # BLD AUTO: 365 THOUSANDS/UL (ref 149–390)
PMV BLD AUTO: 10.4 FL (ref 8.9–12.7)
POTASSIUM SERPL-SCNC: 3.7 MMOL/L (ref 3.5–5.3)
RBC # BLD AUTO: 3.18 MILLION/UL (ref 3.81–5.12)
SODIUM SERPL-SCNC: 143 MMOL/L (ref 136–145)
WBC # BLD AUTO: 9.28 THOUSAND/UL (ref 4.31–10.16)

## 2019-07-25 PROCEDURE — C9113 INJ PANTOPRAZOLE SODIUM, VIA: HCPCS | Performed by: INTERNAL MEDICINE

## 2019-07-25 PROCEDURE — 92526 ORAL FUNCTION THERAPY: CPT

## 2019-07-25 PROCEDURE — 99232 SBSQ HOSP IP/OBS MODERATE 35: CPT | Performed by: INTERNAL MEDICINE

## 2019-07-25 PROCEDURE — 99239 HOSP IP/OBS DSCHRG MGMT >30: CPT | Performed by: HOSPITALIST

## 2019-07-25 PROCEDURE — 80048 BASIC METABOLIC PNL TOTAL CA: CPT | Performed by: INTERNAL MEDICINE

## 2019-07-25 PROCEDURE — 85027 COMPLETE CBC AUTOMATED: CPT | Performed by: INTERNAL MEDICINE

## 2019-07-25 RX ORDER — AMOXICILLIN AND CLAVULANATE POTASSIUM 500; 125 MG/1; MG/1
1 TABLET, FILM COATED ORAL EVERY 12 HOURS SCHEDULED
Qty: 6 TABLET | Refills: 0 | Status: SHIPPED | OUTPATIENT
Start: 2019-07-25 | End: 2019-07-28

## 2019-07-25 RX ORDER — AMOXICILLIN AND CLAVULANATE POTASSIUM 500; 125 MG/1; MG/1
1 TABLET, FILM COATED ORAL EVERY 12 HOURS SCHEDULED
Status: DISCONTINUED | OUTPATIENT
Start: 2019-07-25 | End: 2019-07-25 | Stop reason: HOSPADM

## 2019-07-25 RX ADMIN — AMOXICILLIN AND CLAVULANATE POTASSIUM 1 TABLET: 500; 125 TABLET, FILM COATED ORAL at 10:54

## 2019-07-25 RX ADMIN — PANTOPRAZOLE SODIUM 40 MG: 40 INJECTION, POWDER, FOR SOLUTION INTRAVENOUS at 08:49

## 2019-07-25 RX ADMIN — METRONIDAZOLE 500 MG: 500 INJECTION, SOLUTION INTRAVENOUS at 05:00

## 2019-07-25 RX ADMIN — IRON SUCROSE 200 MG: 20 INJECTION, SOLUTION INTRAVENOUS at 10:51

## 2019-07-25 RX ADMIN — HEPARIN SODIUM 5000 UNITS: 5000 INJECTION INTRAVENOUS; SUBCUTANEOUS at 05:22

## 2019-07-25 NOTE — ASSESSMENT & PLAN NOTE
-CXR:  Findings concerning for right upper lobe pneumonia    -Currently on Azithro/Rocephin/Flagyl   -Change abx to Augmentin to complete 7 days abx  -Strep PNA/legionella/BCx NEG  -Procal 0 26  -Speech eval note Dys I, thin diet

## 2019-07-25 NOTE — ASSESSMENT & PLAN NOTE
-Esophagram: Esophageal dysmotility with esophageal dilatation, numerous tertiary contractions, and delayed emptying of esophageal contents into the stomach  No mucosal abnormality or stricture  Zenker's diverticulum  -EGD 7/24/19: Normal mucosa in the esophagus  Thick secretions noted throughout the oropharynx into the esophageal lumen  Small hiatal hernia   Normal mucosa in the stomach and duodenum   -now on Dys I diet  -can have VFSS as outpt

## 2019-07-25 NOTE — DISCHARGE SUMMARY
Discharge- Josh Alicea 3/13/1926, 80 y o  female MRN: [id number]    Unit/Bed#: [address] [place] [address] Encounter: [id number]    Primary Care Provider: Geoff Sahu DO   Date and time admitted to hospital: 7/22/2019  1:32 PM        Iron deficiency anemia  Assessment & Plan  -Hb 8 2  -Fe low, received IV Venofer, d/c on PO Fe  -B12, folate nl    CKD (chronic kidney disease) stage 4, GFR 15-29 ml/min ([facility])  Assessment & Plan  · At baseline creat 1 4-1 9    Aspiration pneumonia ([facility])  Assessment & Plan  -CXR:  Findings concerning for right upper lobe pneumonia  -Currently on Azithro/Rocephin/Flagyl   -Change abx to Augmentin to complete 7 days abx  -Strep PNA/legionella/BCx NEG  -Procal 0 26  -Speech eval note Dys I, thin diet    Zenker's (hypopharyngeal) diverticulum  Assessment & Plan  -noted    Rectal bleeding  Assessment & Plan  · Occult neg per ER RN      * Dysphagia  Assessment & Plan  -Esophagram: Esophageal dysmotility with esophageal dilatation, numerous tertiary contractions, and delayed emptying of esophageal contents into the stomach  No mucosal abnormality or stricture  Zenker's diverticulum  -EGD 7/24/19: Normal mucosa in the esophagus  Thick secretions noted throughout the oropharynx into the esophageal lumen  Small hiatal hernia   Normal mucosa in the stomach and duodenum   -now on Dys I diet  -can have VFSS as outpt        Discharging Physician / Practitioner: Vale Borges MD  PCP: Geoff Sahu DO  Admission Date:   Admission Orders (From admission, onward)     Ordered        07/22/19 1527  Inpatient Admission (expected length of stay for this patient Order details is greater than two midnights)  Once                   Discharge Date: 07/25/19    Resolved Problems  Date Reviewed: 7/25/2019          Resolved    UTI (urinary tract infection) 7/24/2019     Resolved by  Vale Borges MD          Consultations During Hospital Stay:  · GI    Procedures Performed:   · EGD as above    Significant Findings / Test Results:   · See above    Incidental Findings:   · none     Test Results Pending at Discharge (will require follow up): · None     Outpatient Tests Requested:  · VFSS    Complications:  None    Reason for Admission: dysphagia    Hospital Course:     Kelley Franks is a 80 y o  female patient who originally presented to the hospital on 7/22/2019 due to dysphagia and aspiration PNA as outlined in the H&P done on admission  Please see above list of diagnoses and related plan for additional information  Condition at Discharge: good     Discharge Day Visit / Exam:     Subjective:  Pt says "I don't have any pep "  Vitals: Blood Pressure: 156/66 (07/25/19 0815)  Pulse: 104 (07/25/19 0815)  Temperature: 97 9 °F (36 6 °C) (07/25/19 0815)  Temp Source: Oral (07/25/19 0815)  Respirations: 16 (07/25/19 0815)  Height: 4' 8" (142 2 cm) (07/22/19 1908)  Weight - Scale: 41 kg (90 lb 6 2 oz) (07/25/19 0541)  SpO2: 90 % (07/25/19 0815)  Exam:   Physical Exam  Gen: NAD, AAOx3, well developed, well nourished  Eyes: EOMI, PERRLA, no scleral icterus  ENMT:  no nasal discharge, no otic discharge, moist mucous membranes  Neck:  Supple  Cardiovascular:  Regular rate and rhythm, normal S1-S2, no murmurs, rubs, or gallops  Lungs:  Decreased BS in the bases  no wheezes, or rales, or rhonchi  Abdomen:  Positive bowel sounds, soft, nontender, nondistended, no palpable organomegaly   Skin:  Intact, no obvious lesions or rashes, no edema  Neuro: Cranial nerves 2-12 are intact, non-focal, moves all 4 extremities    Discussion with Family: daughter at bedside    Discharge instructions/Information to patient and family:   See after visit summary for information provided to patient and family  Provisions for Follow-Up Care:  See after visit summary for information related to follow-up care and any pertinent home health orders        Disposition:     Hallie Najera at [facility] to [facility]  [facility]:   · Not Applicable to this Patient - Not Applicable to this Patient    Planned Readmission: None     Discharge Statement:  I spent 31 minutes discharging the patient  This time was spent on the day of discharge  I had direct contact with the patient on the day of discharge  Greater than 50% of the total time was spent examining patient, answering all patient questions, arranging and discussing plan of care with patient as well as directly providing post-discharge instructions  Additional time then spent on discharge activities  Discharge Medications:  See after visit summary for reconciled discharge medications provided to patient and family        ** Please Note: This note has been constructed using a voice recognition system **

## 2019-07-25 NOTE — SOCIAL WORK
Continue to follow  Spoke with Marina Foy in admissions at [facility], facility can accept Pt today  Met with Pt's dtr at bedside  Pt and Pt's dtr is aware of discharge today  Pt informed her nephew will transport Pt to [facility] around 12:00pm  Pt's nurse informed of transport time  All in agreement  Faxed discharge instructions and PASRR to [facility]

## 2019-07-25 NOTE — SPEECH THERAPY NOTE
Speech Language/Pathology    Speech/Language Pathology Progress Note    Patient Name: Shauna Dickson  [id number] Date: 7/25/2019     Problem List  Patient Active Problem List   Diagnosis    Status post right elbow joint replacement    Closed fracture of left olecranon process    Dysphagia    Rectal bleeding    Colon cancer screening    Zenker's (hypopharyngeal) diverticulum    Aspiration pneumonia (HCC)    CKD (chronic kidney disease) stage 4, GFR 15-29 ml/min ([facility])    Iron deficiency anemia    Moderate protein-calorie malnutrition ([address] )        Past Medical History  Past Medical History:   Diagnosis Date    Arthritis     CHF (congestive heart failure) (HCC)     CKD (chronic kidney disease)     Glaucoma     Gout     Hypertension     Macular degeneration     Muscle weakness     RA (rheumatoid arthritis) ([address] )     Wound healing, delayed     pt states chronic wound on left shoulder    Zenker's (hypopharyngeal) diverticulum         Past Surgical History  Past Surgical History:   Procedure Laterality Date    COLONOSCOPY  12/20/2006    Hemorrhoids    ELBOW FRACTURE REPAIR Left     Pins and screws      ESOPHAGEAL DILATION      EYE SURGERY      cataract bilaterally    HERNIA REPAIR      left inguinal    INGUINAL HERNIA REPAIR Left     JOINT REPLACEMENT      MD ARTHROPLASTY,ELBOW,TOTAL PROSTH REPL Right 4/19/2016    Procedure: ARTHROPLASTY ELBOW, SPLINT APPLICATION;  Surgeon: Hugo Jenkins MD;  Location:  MAIN OR;  Service: Orthopedics    MD OPEN 2016 [facility] Left 5/7/2018    Procedure: OPEN REDUCTION W/ INTERNAL FIXATION (ORIF) OF LEFT OLECRANON;  Surgeon: Kim Hanna MD;  Location:  MAIN OR;  Service: Orthopedics    TOTAL ELBOW REPLACEMENT Right     After fracture    UPPER GASTROINTESTINAL ENDOSCOPY  10/03/2014    Small Zenker's with dilation to 48 [place] bougie, gastritis, negative for H pylori         Subjective:  "I dont like the haseeb food"  Objective:  Patient seen sitting in chair, daughter present  GI doctor in room as well  Education provided to patient and daughter regarding findings of barium swallow  Patient has a small zenkers diverticulum but also considerably slow esophageal motility  Patient states she often needs to drink to get food down  Lengthy conversation with daughter and patient regarding the function of the esophagus and how adding more food to the esophagus can make the problem worse  Discussed use of dry swallow, seated in chair for meals to help with esophageal clearance  Patient given PO trials of of softened crackers: Moderate cueing needed for 2 dry swallows after each bite of softened cracker  Then cues given for small sip of liquid with dry swallow to follow  Discussed appropriate softer food choices with patient (eggs, fish, egg/tuna salads)  Also discussed high nutrition drinks ( smoothies, ensure with ice cream shakes) to help with nutrition and safe to swallow foods  Assessment:  Patient swallowing difficulies secodnary to esophageal dysmotility  Lengthy education regarding function of esophagus, swallowing strategies and safer foods to eat  Patient would benefit from continued speech services to help with independence for swallowing strategies       Plan/Recommendations:  Moistened mechanical soft  Seated in chairs for all  Meals  T/C 6 smaller meals rather than 3 larger meals  Small bite of food --> 2 dry swallows --> small sip of liquid --> 2 dry swallows

## 2019-07-25 NOTE — PROGRESS NOTES
Progress note - Gastroenterology   Oleksandr Horta 80 y o  female MRN: [id number]  Unit/Bed#: [address] [facility] 203-01 Encounter: [id number]    ASSESSMENT and PLAN  1  Pneumonia  2  Hx of dysphagia - improved on a pureed diet  EGD 7/24 showed normal mucosa in the esophagus  Thick secretions noted throughout the oropharynx into the esophageal lumen  Small hiatal hernia  Normal mucosa in the stomach and duodenum  3  Hx of a Zenker's Diverticulum      - Continue pureed diet  - Ensure dietary supplementation added by nutrition  - Continue aspiration precautions        Chief Complaint   Patient presents with    Abnormal Lab     pt presents to ER after being increasingly weak, was seen at PCP and was told her hgb was 7 5  patient states having blood in stool and coughing up blood        SUBJECTIVE/HPI   Appears to be tolerating pureed diet, although did have some coughing approximately 20-30 minutes following breakfast    /66 (BP Location: Right arm)   Pulse 104   Temp 97 9 °F (36 6 °C) (Oral)   Resp 16   Ht 4' 8" (1 422 m)   Wt 41 kg (90 lb 6 2 oz)   LMP  (LMP Unknown)   SpO2 90%   BMI 20 26 kg/m²     PHYSICALEXAM  General appearance: alert, appears stated age and cooperative  Head: Normocephalic, without obvious abnormality, atraumatic  Lungs: + wheezes  Heart: regular rate and rhythm, S1, S2 normal, no murmur, click, rub or gallop  Abdomen: soft, non-tender; bowel sounds normal; no masses,  no organomegaly  Extremities: extremities normal, atraumatic, no cyanosis or edema  Neurologic: Grossly normal    Lab Results   Component Value Date    GLUCOSE 103 09/28/2015    CALCIUM 8 8 07/25/2019     09/28/2015    K 3 7 07/25/2019    CO2 22 07/25/2019     (H) 07/25/2019    BUN 22 07/25/2019    CREATININE 1 35 (H) 07/25/2019     Lab Results   Component Value Date    WBC 9 28 07/25/2019    HGB 8 2 (L) 07/25/2019    HCT 27 1 (L) 07/25/2019    MCV 85 07/25/2019     07/25/2019     Lab Results   Component Value Date    ALT 16 07/22/2019    AST 34 07/22/2019    ALKPHOS 101 07/22/2019    BILITOT 0 29 09/28/2015     No results found for: AMYLASE  No results found for: LIPASE  Lab Results   Component Value Date    IRON 18 (L) 07/23/2019    TIBC 157 (L) 07/23/2019    FERRITIN 82 07/23/2019     Lab Results   Component Value Date    INR 1 14 07/22/2019       Counseling / Coordination of Care  Total floor / unit time spent today 20 minutes

## 2019-07-25 NOTE — PLAN OF CARE
Problem: Potential for Falls  Goal: Patient will remain free of falls  Description  INTERVENTIONS:  - Assess patient frequently for physical needs  -  Identify cognitive and physical deficits and behaviors that affect risk of falls  -  Hume fall precautions as indicated by assessment   - Educate patient/family on patient safety including physical limitations  - Instruct patient to call for assistance with activity based on assessment  - Modify environment to reduce risk of injury  - Consider OT/PT consult to assist with strengthening/mobility  Outcome: Progressing     Problem: Nutrition/Hydration-ADULT  Goal: Nutrient/Hydration intake appropriate for improving, restoring or maintaining nutritional needs  Description  Monitor and assess patient's nutrition/hydration status for malnutrition (ex- brittle hair, bruises, dry skin, pale skin and conjunctiva, muscle wasting, smooth red tongue, and disorientation)  Collaborate with interdisciplinary team and initiate plan and interventions as ordered  Monitor patient's weight and dietary intake as ordered or per policy  Utilize nutrition screening tool and intervene per policy  Determine patient's food preferences and provide high-protein, high-caloric foods as appropriate       INTERVENTIONS:  - Monitor oral intake, urinary output, labs, and treatment plans  - Assess nutrition and hydration status and recommend course of action  - Evaluate amount of meals eaten  - Assist patient with eating if necessary   - Allow adequate time for meals  - Recommend/ encourage appropriate diets, oral nutritional supplements, and vitamin/mineral supplements  - Order, calculate, and assess calorie counts as needed  - Recommend, monitor, and adjust tube feedings and TPN/PPN based on assessed needs  - Assess need for intravenous fluids  - Provide specific nutrition/hydration education as appropriate  - Include patient/family/caregiver in decisions related to nutrition  Outcome: Progressing     Problem: Prexisting or High Potential for Compromised Skin Integrity  Goal: Skin integrity is maintained or improved  Description  INTERVENTIONS:  - Identify patients at risk for skin breakdown  - Assess and monitor skin integrity  - Assess and monitor nutrition and hydration status  - Monitor labs (i e  albumin)  - Assess for incontinence   - Turn and reposition patient  - Assist with mobility/ambulation  - Relieve pressure over bony prominences  - Avoid friction and shearing  - Provide appropriate hygiene as needed including keeping skin clean and dry  - Evaluate need for skin moisturizer/barrier cream  - Collaborate with interdisciplinary team (i e  Nutrition, Rehabilitation, etc )   - Patient/family teaching  Outcome: Progressing     Problem: RESPIRATORY - ADULT  Goal: Achieves optimal ventilation and oxygenation  Description  INTERVENTIONS:  - Assess for changes in respiratory status  - Assess for changes in mentation and behavior  - Position to facilitate oxygenation and minimize respiratory effort  - Oxygen administration by appropriate delivery method based on oxygen saturation (per order) or ABGs  - Initiate smoking cessation education as indicated  - Encourage broncho-pulmonary hygiene including cough, deep breathe, Incentive Spirometry  - Assess the need for suctioning and aspirate as needed  - Assess and instruct to report SOB or any respiratory difficulty  - Respiratory Therapy support as indicated  Outcome: Progressing     Problem: GASTROINTESTINAL - ADULT  Goal: Maintains adequate nutritional intake  Description  INTERVENTIONS:  - Monitor percentage of each meal consumed  - Identify factors contributing to decreased intake, treat as appropriate  - Assist with meals as needed  - Monitor I&O, WT and lab values  - Obtain nutrition services referral as needed  Outcome: Progressing     Problem: SKIN/TISSUE INTEGRITY - ADULT  Goal: Skin integrity remains intact  Description  INTERVENTIONS  - Identify patients at risk for skin breakdown  - Assess and monitor skin integrity  - Assess and monitor nutrition and hydration status  - Monitor labs (i e  albumin)  - Assess for incontinence   - Turn and reposition patient  - Assist with mobility/ambulation  - Relieve pressure over bony prominences  - Avoid friction and shearing  - Provide appropriate hygiene as needed including keeping skin clean and dry  - Evaluate need for skin moisturizer/barrier cream  - Collaborate with interdisciplinary team (i e  Nutrition, Rehabilitation, etc )   - Patient/family teaching  Outcome: Progressing     Problem: MUSCULOSKELETAL - ADULT  Goal: Maintain or return mobility to safest level of function  Description  INTERVENTIONS:  - Assess patient's ability to carry out ADLs; assess patient's baseline for ADL function and identify physical deficits which impact ability to perform ADLs (bathing, care of mouth/teeth, toileting, grooming, dressing, etc )  - Assess/evaluate cause of self-care deficits   - Assess range of motion  - Assess patient's mobility; develop plan if impaired  - Assess patient's need for assistive devices and provide as appropriate  - Encourage maximum independence but intervene and supervise when necessary  - Involve family in performance of ADLs  - Assess for home care needs following discharge   - Request OT consult to assist with ADL evaluation and planning for discharge  - Provide patient education as appropriate  Outcome: Progressing

## 2019-07-27 LAB
BACTERIA BLD CULT: NORMAL
BACTERIA BLD CULT: NORMAL

## (undated) DEVICE — DRAPE C-ARM X-RAY

## (undated) DEVICE — OCCLUSIVE GAUZE STRIP,3% BISMUTH TRIBROMOPHENATE IN PETROLATUM BLEND: Brand: XEROFORM

## (undated) DEVICE — SUT VICRYL 2-0 SH 27 IN UNDYED J417H

## (undated) DEVICE — TUBING SUCTION 5MM X 12 FT

## (undated) DEVICE — GLOVE SRG BIOGEL ORTHOPEDIC 7

## (undated) DEVICE — PADDING CAST 4 IN  COTTON STRL

## (undated) DEVICE — ACE WRAP 4 IN UNSTERILE

## (undated) DEVICE — PREP SURGICAL PURPREP 26ML

## (undated) DEVICE — 2.8MM DRILL BIT/QC/165MM

## (undated) DEVICE — 3M™ STERI-STRIP™ REINFORCED ADHESIVE SKIN CLOSURES, R1547, 1/2 IN X 4 IN (12 MM X 100 MM), 6 STRIPS/ENVELOPE: Brand: 3M™ STERI-STRIP™

## (undated) DEVICE — DRILL BIT 2.0MM

## (undated) DEVICE — U-DRAPE: Brand: CONVERTORS

## (undated) DEVICE — NEEDLE 25G X 1 1/2

## (undated) DEVICE — GLOVE SRG BIOGEL 7

## (undated) DEVICE — GAUZE SPONGES,16 PLY: Brand: CURITY

## (undated) DEVICE — 3M™ STERI-STRIP™ REINFORCED ADHESIVE SKIN CLOSURES, R1546, 1/4 IN X 4 IN (6 MM X 100 MM), 10 STRIPS/ENVELOPE: Brand: 3M™ STERI-STRIP™

## (undated) DEVICE — REM POLYHESIVE ADULT PATIENT RETURN ELECTRODE: Brand: VALLEYLAB

## (undated) DEVICE — SUT VICRYL 0 CT-1 27 IN J260H

## (undated) DEVICE — GLOVE INDICATOR PI UNDERGLOVE SZ 7.5 BLUE

## (undated) DEVICE — PACK PLASTIC HAND PBDS

## (undated) DEVICE — 1.25MM KIRSCHNER WIRE W/TROCAR POINT 150MM
Type: IMPLANTABLE DEVICE | Site: ELBOW | Status: NON-FUNCTIONAL
Removed: 2018-05-07

## (undated) DEVICE — HEWSON SUTURE RETRIEVER: Brand: HEWSON SUTURE RETRIEVER

## (undated) DEVICE — 3000CC GUARDIAN II: Brand: GUARDIAN

## (undated) DEVICE — 2.5MM DRILL BIT/QC/GOLD/110MM

## (undated) DEVICE — INTENDED FOR TISSUE SEPARATION, AND OTHER PROCEDURES THAT REQUIRE A SHARP SURGICAL BLADE TO PUNCTURE OR CUT.: Brand: BARD-PARKER SAFETY BLADES SIZE 10, STERILE

## (undated) DEVICE — INTENDED FOR TISSUE SEPARATION, AND OTHER PROCEDURES THAT REQUIRE A SHARP SURGICAL BLADE TO PUNCTURE OR CUT.: Brand: BARD-PARKER SAFETY BLADES SIZE 15, STERILE

## (undated) DEVICE — BANDAGE, ESMARK LF STR 4"X9'(20/CS): Brand: CYPRESS

## (undated) DEVICE — BUTTON SWITCH PENCIL HOLSTER: Brand: VALLEYLAB